# Patient Record
Sex: FEMALE | Race: WHITE | NOT HISPANIC OR LATINO | Employment: UNEMPLOYED | URBAN - METROPOLITAN AREA
[De-identification: names, ages, dates, MRNs, and addresses within clinical notes are randomized per-mention and may not be internally consistent; named-entity substitution may affect disease eponyms.]

---

## 2018-06-27 ENCOUNTER — OFFICE VISIT (OUTPATIENT)
Dept: OBSTETRICS AND GYNECOLOGY | Facility: CLINIC | Age: 33
End: 2018-06-27
Payer: MEDICAID

## 2018-06-27 VITALS
WEIGHT: 189 LBS | BODY MASS INDEX: 30.37 KG/M2 | HEIGHT: 66 IN | DIASTOLIC BLOOD PRESSURE: 70 MMHG | SYSTOLIC BLOOD PRESSURE: 124 MMHG

## 2018-06-27 DIAGNOSIS — K21.9 GASTROESOPHAGEAL REFLUX DISEASE WITHOUT ESOPHAGITIS: ICD-10-CM

## 2018-06-27 DIAGNOSIS — Z3A.31 31 WEEKS GESTATION OF PREGNANCY: ICD-10-CM

## 2018-06-27 DIAGNOSIS — Z32.01 POSITIVE PREGNANCY TEST: Primary | ICD-10-CM

## 2018-06-27 DIAGNOSIS — R11.2 NAUSEA AND VOMITING, INTRACTABILITY OF VOMITING NOT SPECIFIED, UNSPECIFIED VOMITING TYPE: ICD-10-CM

## 2018-06-27 PROCEDURE — 99202 OFFICE O/P NEW SF 15 MIN: CPT | Mod: S$PBB,TH,, | Performed by: OBSTETRICS & GYNECOLOGY

## 2018-06-27 PROCEDURE — 99999 PR PBB SHADOW E&M-NEW PATIENT-LVL III: CPT | Mod: PBBFAC,,, | Performed by: OBSTETRICS & GYNECOLOGY

## 2018-06-27 PROCEDURE — 99203 OFFICE O/P NEW LOW 30 MIN: CPT | Mod: PBBFAC,PO | Performed by: OBSTETRICS & GYNECOLOGY

## 2018-06-27 RX ORDER — ONDANSETRON 4 MG/1
4 TABLET, ORALLY DISINTEGRATING ORAL
Qty: 20 TABLET | Refills: 1 | Status: SHIPPED | OUTPATIENT
Start: 2018-06-27 | End: 2018-07-27

## 2018-06-27 RX ORDER — FERROUS SULFATE 324(65)MG
325 TABLET, DELAYED RELEASE (ENTERIC COATED) ORAL DAILY
COMMUNITY

## 2018-06-27 RX ORDER — PROMETHAZINE HYDROCHLORIDE 25 MG/1
25 TABLET ORAL
Qty: 30 TABLET | Refills: 1 | Status: SHIPPED | OUTPATIENT
Start: 2018-06-27 | End: 2018-07-27

## 2018-06-27 NOTE — LETTER
Victor Hugo Interiano M.D.  Castillo Corrales M.D..  NEHAL Salcedo M.D. Stacey B. Clasen, M.D. Jennifer L. Bigelow, M.D. Mark M. Allen, M.D. Ilsa Leon, M.D Geoffrey Gillen, M.D.                                                                               [address] [place] [address]  Tel [phone]  Fax [phone]        June 27, 2018    Micaela Johnson  [address] [facility] [address]      To Whom It May Concern:    Ms. Johnson is currently under our care for pregnancy: Estimated Date of Delivery: None noted.. Her first visit for this pregnancy was *** .      Sincerely,      Victor Hugo Interiano M.D.  Castillo Corrales M.D.  Iraida Castaneda M.D.  NEHAL Edwards M.D.  NEHAL Pedraza M.D. Ilsa Leon, M.D.  Gene Jiang M.D.  cc

## 2018-06-27 NOTE — PROGRESS NOTES
CC: positive pregnancy test     HPI:   Micaela Johnson 33 y.o.  at 31 5/7 wga by stated EDC which is based on her lmp is here to establish care. [place]. She reports she has had N/V the whole pregnancy and was taking medications [place] but ran out. She also has GERD.  No pregnancy complaints. No Vb, VD, contractions. + FM     OB history: none      No LMP recorded. Patient is pregnant.     History reviewed. No pertinent past medical history.    History reviewed. No pertinent surgical history.    History reviewed. No pertinent family history.    Social History     Social History    Marital status:      Spouse name: N/A    Number of children: N/A    Years of education: N/A     Occupational History    Not on file.     Social History Main Topics    Smoking status: Never Smoker    Smokeless tobacco: Never Used    Alcohol use No    Drug use: No    Sexual activity: Not Currently     Other Topics Concern    Not on file     Social History Narrative    No narrative on file       OB History      Para Term  AB Living    1              SAB TAB Ectopic Multiple Live Births                        COMPREHENSIVE GYN HISTORY:  PAP History: Denies abnormal Paps.  Infection History: Denies STDs. Denies PID.  Benign History: Denies uterine fibroids. Denies ovarian cysts. Denies endometriosis.   Cancer History: Denies cervical cancer. Denies uterine cancer or hyperplasia. Denies ovarian cancer. Denies vulvar cancer or pre-cancer. Denies vaginal cancer or pre-cancer. Denies breast cancer. Denies colon cancer.  Sexual Activity History:   reports that she does not currently engage in sexual activity.   Menstrual History: Age of menarche: 13 years. Every mon, flows for 5 days.       ROS:  GENERAL: Denies weight gain or weight loss. Feeling well overall.   SKIN: Denies rash or lesions.   HEAD: Denies headache.   NODES: Denies enlarged lymph  "nodes.   CHEST: Denies shortness of breath or chest pain   ABDOMEN: No abdominal pain, constipation, diarrhea, nausea, vomiting or rectal bleeding.   URINARY: No frequency, dysuria, hematuria, or burning on urination.  REPRODUCTIVE: See HPI.   BREASTS: The patient denies pain, lumps, or nipple discharge.   HEMATOLOGIC: No easy bruisability.   MUSCULOSKELETAL: Denies joint pain or back pain.   NEUROLOGIC: Denies weakness.   PSYCHIATRIC: Denies depression, anxiety or mood swings.    PE:   /70   Ht 5' 6" (1.676 m)   Wt 85.7 kg (189 lb)   BMI 30.51 kg/m²     APPEARANCE: Well nourished, well developed, in no acute distress.  NECK: Neck symmetric without  thyromegaly.  NODES: No inguinal, cervical lymph node enlargement.  CHEST: Lungs clear to auscultation.  HEART: Regular rate and rhythm, no murmurs, rubs or gallops.  ABDOMEN: Soft. No tenderness or masses. No hernias.  Fundal Height: 33  FHT: 135         1. Positive pregnancy test    2. 31 weeks gestation of pregnancy    3. Nausea and vomiting, intractability of vomiting not specified, unspecified vomiting type    4. Gastroesophageal reflux disease without esophagitis        Plan:    1. IOB labs and dM test and dating US ordered, PNV ordered.   2 will send in phenergen and zofran and discussed taking pepcid.   3. Discussed with patient the way the office works. That we are 1/2 men and women. I will do my best to be there at the delivery but if I can't make it that there would be another physician there who could care for them.   4. Will RTC in 2 weeks with US, hopefully medicaid will be active then and can do STD testing, US and pap.        "

## 2018-06-27 NOTE — LETTER
June 27, 2018    Micaela Johnson  [address] [place] [address]             [facility]  [address] [facility], [address]  [address] [facility]  [place] [address]  Phone: [phone]  To Whom It May Concern:      Ms. Johnson is currently under our care for pregnancy: Estimated Date of Delivery: 08/24/2018.. Her first visit for this pregnancy was 06/27/2018 .       If you have any questions or concerns, please don't hesitate to call.        Sincerely,        Diana Byrne MD

## 2018-07-11 ENCOUNTER — PROCEDURE VISIT (OUTPATIENT)
Dept: OBSTETRICS AND GYNECOLOGY | Facility: CLINIC | Age: 33
End: 2018-07-11
Payer: MEDICAID

## 2018-07-11 ENCOUNTER — ROUTINE PRENATAL (OUTPATIENT)
Dept: OBSTETRICS AND GYNECOLOGY | Facility: CLINIC | Age: 33
End: 2018-07-11
Payer: MEDICAID

## 2018-07-11 VITALS — BODY MASS INDEX: 30.51 KG/M2 | WEIGHT: 189 LBS | SYSTOLIC BLOOD PRESSURE: 122 MMHG | DIASTOLIC BLOOD PRESSURE: 78 MMHG

## 2018-07-11 DIAGNOSIS — Z32.01 POSITIVE PREGNANCY TEST: ICD-10-CM

## 2018-07-11 DIAGNOSIS — R11.2 NAUSEA AND VOMITING, INTRACTABILITY OF VOMITING NOT SPECIFIED, UNSPECIFIED VOMITING TYPE: ICD-10-CM

## 2018-07-11 DIAGNOSIS — O09.30 INSUFFICIENT PRENATAL CARE, UNSPECIFIED TRIMESTER: Primary | ICD-10-CM

## 2018-07-11 DIAGNOSIS — Z3A.33 33 WEEKS GESTATION OF PREGNANCY: Primary | ICD-10-CM

## 2018-07-11 DIAGNOSIS — Z36.3 ANTENATAL SCREENING FOR MALFORMATION USING ULTRASONICS: ICD-10-CM

## 2018-07-11 DIAGNOSIS — K21.9 GASTROESOPHAGEAL REFLUX DISEASE WITHOUT ESOPHAGITIS: ICD-10-CM

## 2018-07-11 PROCEDURE — 99999 PR PBB SHADOW E&M-EST. PATIENT-LVL II: CPT | Mod: PBBFAC,,, | Performed by: OBSTETRICS & GYNECOLOGY

## 2018-07-11 PROCEDURE — 76819 FETAL BIOPHYS PROFIL W/O NST: CPT | Mod: PBBFAC,PO

## 2018-07-11 PROCEDURE — 99212 OFFICE O/P EST SF 10 MIN: CPT | Mod: PBBFAC,PO,25 | Performed by: OBSTETRICS & GYNECOLOGY

## 2018-07-11 PROCEDURE — 76819 FETAL BIOPHYS PROFIL W/O NST: CPT | Mod: 26,S$PBB,, | Performed by: OBSTETRICS & GYNECOLOGY

## 2018-07-11 PROCEDURE — 76805 OB US >/= 14 WKS SNGL FETUS: CPT | Mod: 26,S$PBB,, | Performed by: OBSTETRICS & GYNECOLOGY

## 2018-07-11 PROCEDURE — 99212 OFFICE O/P EST SF 10 MIN: CPT | Mod: TH,S$PBB,25, | Performed by: OBSTETRICS & GYNECOLOGY

## 2018-07-11 PROCEDURE — 76805 OB US >/= 14 WKS SNGL FETUS: CPT | Mod: PBBFAC,PO

## 2018-07-11 NOTE — PROGRESS NOTES
@ 33 4/7 wga   1. Still having N/V, didn't go  medications. Weight stable from last week. No ketones in urine, encouraged to  medications  2. Patient doesn't want to lab work or exam because she is worried she will get a large bill because her medicaid isn't active, will do next week but discussed with her that normally they retro pay it.   3. US today c/w period and growth appropriate at 25%, BPP 8/8   4. Will do gbs next visit   5. Dizziness when stands, orthostatics negative, encouraged PO hydration and to do labs to make sure not too anemic.

## 2018-07-18 ENCOUNTER — ROUTINE PRENATAL (OUTPATIENT)
Dept: OBSTETRICS AND GYNECOLOGY | Facility: CLINIC | Age: 33
End: 2018-07-18
Payer: MEDICAID

## 2018-07-18 VITALS
BODY MASS INDEX: 34.27 KG/M2 | WEIGHT: 212.31 LBS | DIASTOLIC BLOOD PRESSURE: 82 MMHG | SYSTOLIC BLOOD PRESSURE: 125 MMHG

## 2018-07-18 DIAGNOSIS — Z3A.34 34 WEEKS GESTATION OF PREGNANCY: Primary | ICD-10-CM

## 2018-07-18 PROCEDURE — 99212 OFFICE O/P EST SF 10 MIN: CPT | Mod: PBBFAC,PO | Performed by: OBSTETRICS & GYNECOLOGY

## 2018-07-18 PROCEDURE — 87624 HPV HI-RISK TYP POOLED RSLT: CPT

## 2018-07-18 PROCEDURE — 88175 CYTOPATH C/V AUTO FLUID REDO: CPT

## 2018-07-18 PROCEDURE — 99212 OFFICE O/P EST SF 10 MIN: CPT | Mod: TH,S$PBB,, | Performed by: OBSTETRICS & GYNECOLOGY

## 2018-07-18 PROCEDURE — 87491 CHLMYD TRACH DNA AMP PROBE: CPT

## 2018-07-18 PROCEDURE — 99999 PR PBB SHADOW E&M-EST. PATIENT-LVL II: CPT | Mod: PBBFAC,,, | Performed by: OBSTETRICS & GYNECOLOGY

## 2018-07-18 NOTE — PROGRESS NOTES
@ 34 4/7 wga   1. N/V resolved   2. Encourage to do lab work today and will do GBS next visit  3. Consents done today with language line

## 2018-07-21 LAB
C TRACH DNA SPEC QL NAA+PROBE: NOT DETECTED
N GONORRHOEA DNA SPEC QL NAA+PROBE: NOT DETECTED

## 2018-07-24 LAB
HPV HR 12 DNA CVX QL NAA+PROBE: NEGATIVE
HPV16 AG SPEC QL: NEGATIVE
HPV18 DNA SPEC QL NAA+PROBE: NEGATIVE

## 2018-07-25 ENCOUNTER — ROUTINE PRENATAL (OUTPATIENT)
Dept: OBSTETRICS AND GYNECOLOGY | Facility: CLINIC | Age: 33
End: 2018-07-25
Payer: MEDICAID

## 2018-07-25 VITALS
BODY MASS INDEX: 31.24 KG/M2 | DIASTOLIC BLOOD PRESSURE: 78 MMHG | SYSTOLIC BLOOD PRESSURE: 118 MMHG | WEIGHT: 193.56 LBS

## 2018-07-25 DIAGNOSIS — O12.10 PROTEINURIA DURING PREGNANCY: ICD-10-CM

## 2018-07-25 DIAGNOSIS — Z3A.35 35 WEEKS GESTATION OF PREGNANCY: ICD-10-CM

## 2018-07-25 DIAGNOSIS — O09.30 INSUFFICIENT PRENATAL CARE, UNSPECIFIED TRIMESTER: Primary | ICD-10-CM

## 2018-07-25 PROCEDURE — 84156 ASSAY OF PROTEIN URINE: CPT

## 2018-07-25 PROCEDURE — 99999 PR PBB SHADOW E&M-EST. PATIENT-LVL II: CPT | Mod: PBBFAC,,, | Performed by: OBSTETRICS & GYNECOLOGY

## 2018-07-25 PROCEDURE — 99213 OFFICE O/P EST LOW 20 MIN: CPT | Mod: TH,S$PBB,, | Performed by: OBSTETRICS & GYNECOLOGY

## 2018-07-25 PROCEDURE — 99212 OFFICE O/P EST SF 10 MIN: CPT | Mod: PBBFAC,TH,PO | Performed by: OBSTETRICS & GYNECOLOGY

## 2018-07-25 NOTE — PROGRESS NOTES
For the headache increase water intake genet with the excessive heat. Zantac daily for heartburn. P/C ration sent but likely 1+ 2/2 blood in urine

## 2018-07-25 NOTE — PROGRESS NOTES
Patient has trace of leukocytes and +3 blood in urine. Patient is complaining of heartburn and headache

## 2018-07-26 LAB
CREAT UR-MCNC: 379 MG/DL
PROT UR-MCNC: 49 MG/DL
PROT/CREAT RATIO, UR: 0.13

## 2018-08-02 ENCOUNTER — ROUTINE PRENATAL (OUTPATIENT)
Dept: OBSTETRICS AND GYNECOLOGY | Facility: CLINIC | Age: 33
End: 2018-08-02
Payer: MEDICAID

## 2018-08-02 ENCOUNTER — LAB VISIT (OUTPATIENT)
Dept: LAB | Facility: HOSPITAL | Age: 33
End: 2018-08-02
Attending: OBSTETRICS & GYNECOLOGY
Payer: MEDICAID

## 2018-08-02 VITALS — SYSTOLIC BLOOD PRESSURE: 108 MMHG | WEIGHT: 196.44 LBS | DIASTOLIC BLOOD PRESSURE: 70 MMHG | BODY MASS INDEX: 31.7 KG/M2

## 2018-08-02 DIAGNOSIS — Z3A.36 36 WEEKS GESTATION OF PREGNANCY: ICD-10-CM

## 2018-08-02 DIAGNOSIS — O09.30 LATE PRENATAL CARE: ICD-10-CM

## 2018-08-02 DIAGNOSIS — Z3A.36 36 WEEKS GESTATION OF PREGNANCY: Primary | ICD-10-CM

## 2018-08-02 LAB
ALBUMIN SERPL BCP-MCNC: 3 G/DL
ALP SERPL-CCNC: 100 U/L
ALT SERPL W/O P-5'-P-CCNC: 15 U/L
ANION GAP SERPL CALC-SCNC: 7 MMOL/L
AST SERPL-CCNC: 15 U/L
BILIRUB SERPL-MCNC: 0.4 MG/DL
BUN SERPL-MCNC: 7 MG/DL
CALCIUM SERPL-MCNC: 8.6 MG/DL
CHLORIDE SERPL-SCNC: 106 MMOL/L
CO2 SERPL-SCNC: 23 MMOL/L
CREAT SERPL-MCNC: 0.6 MG/DL
EST. GFR  (AFRICAN AMERICAN): >60 ML/MIN/1.73 M^2
EST. GFR  (NON AFRICAN AMERICAN): >60 ML/MIN/1.73 M^2
GLUCOSE SERPL-MCNC: 93 MG/DL
POTASSIUM SERPL-SCNC: 3.7 MMOL/L
PROT SERPL-MCNC: 6.8 G/DL
SODIUM SERPL-SCNC: 136 MMOL/L

## 2018-08-02 PROCEDURE — 36415 COLL VENOUS BLD VENIPUNCTURE: CPT

## 2018-08-02 PROCEDURE — 99213 OFFICE O/P EST LOW 20 MIN: CPT | Mod: PBBFAC,TH,PO | Performed by: OBSTETRICS & GYNECOLOGY

## 2018-08-02 PROCEDURE — 99999 PR PBB SHADOW E&M-EST. PATIENT-LVL III: CPT | Mod: PBBFAC,,, | Performed by: OBSTETRICS & GYNECOLOGY

## 2018-08-02 PROCEDURE — 87081 CULTURE SCREEN ONLY: CPT

## 2018-08-02 PROCEDURE — 99212 OFFICE O/P EST SF 10 MIN: CPT | Mod: TH,S$PBB,, | Performed by: OBSTETRICS & GYNECOLOGY

## 2018-08-02 PROCEDURE — 80053 COMPREHEN METABOLIC PANEL: CPT

## 2018-08-02 RX ORDER — FAMOTIDINE 20 MG/1
20 TABLET, FILM COATED ORAL 2 TIMES DAILY
Qty: 60 TABLET | Refills: 1 | Status: SHIPPED | OUTPATIENT
Start: 2018-08-02 | End: 2019-08-02

## 2018-08-02 RX ORDER — PROMETHAZINE HYDROCHLORIDE 25 MG/1
25 TABLET ORAL
Qty: 30 TABLET | Refills: 1 | Status: SHIPPED | OUTPATIENT
Start: 2018-08-02 | End: 2018-09-01

## 2018-08-02 NOTE — PROGRESS NOTES
@ 36 5/7 wga   1. N/V improved but refill sent in  2. Encourage to do lab work today for the 3rd time! and will do GBS today  3. pepcid sent for gerd   4. Repeat US next week for growth as she was late to care

## 2018-08-06 LAB — BACTERIA SPEC AEROBE CULT: NORMAL

## 2018-08-09 ENCOUNTER — LAB VISIT (OUTPATIENT)
Dept: LAB | Facility: HOSPITAL | Age: 33
End: 2018-08-09
Attending: OBSTETRICS & GYNECOLOGY
Payer: MEDICAID

## 2018-08-09 ENCOUNTER — ROUTINE PRENATAL (OUTPATIENT)
Dept: OBSTETRICS AND GYNECOLOGY | Facility: CLINIC | Age: 33
End: 2018-08-09
Payer: MEDICAID

## 2018-08-09 ENCOUNTER — PROCEDURE VISIT (OUTPATIENT)
Dept: OBSTETRICS AND GYNECOLOGY | Facility: CLINIC | Age: 33
End: 2018-08-09
Payer: MEDICAID

## 2018-08-09 VITALS — WEIGHT: 194 LBS | DIASTOLIC BLOOD PRESSURE: 80 MMHG | SYSTOLIC BLOOD PRESSURE: 130 MMHG | BODY MASS INDEX: 31.31 KG/M2

## 2018-08-09 DIAGNOSIS — Z32.01 POSITIVE PREGNANCY TEST: ICD-10-CM

## 2018-08-09 DIAGNOSIS — K21.9 GASTROESOPHAGEAL REFLUX DISEASE WITHOUT ESOPHAGITIS: ICD-10-CM

## 2018-08-09 DIAGNOSIS — O09.30 LATE PRENATAL CARE: ICD-10-CM

## 2018-08-09 DIAGNOSIS — R11.2 NAUSEA AND VOMITING, INTRACTABILITY OF VOMITING NOT SPECIFIED, UNSPECIFIED VOMITING TYPE: ICD-10-CM

## 2018-08-09 DIAGNOSIS — Z36.89 ENCOUNTER FOR ULTRASOUND TO CHECK FETAL GROWTH: Primary | ICD-10-CM

## 2018-08-09 DIAGNOSIS — Z3A.37 37 WEEKS GESTATION OF PREGNANCY: Primary | ICD-10-CM

## 2018-08-09 LAB
ABO + RH BLD: NORMAL
BASOPHILS # BLD AUTO: 0.02 K/UL
BASOPHILS NFR BLD: 0.3 %
BLD GP AB SCN CELLS X3 SERPL QL: NORMAL
DIFFERENTIAL METHOD: ABNORMAL
EOSINOPHIL # BLD AUTO: 0 K/UL
EOSINOPHIL NFR BLD: 0.6 %
ERYTHROCYTE [DISTWIDTH] IN BLOOD BY AUTOMATED COUNT: 12 %
GLUCOSE SERPL-MCNC: 104 MG/DL
HCT VFR BLD AUTO: 30.6 %
HGB BLD-MCNC: 10.3 G/DL
LYMPHOCYTES # BLD AUTO: 1.2 K/UL
LYMPHOCYTES NFR BLD: 17.1 %
MCH RBC QN AUTO: 30.6 PG
MCHC RBC AUTO-ENTMCNC: 33.7 G/DL
MCV RBC AUTO: 91 FL
MONOCYTES # BLD AUTO: 0.8 K/UL
MONOCYTES NFR BLD: 10.6 %
NEUTROPHILS # BLD AUTO: 5.1 K/UL
NEUTROPHILS NFR BLD: 70.8 %
PLATELET # BLD AUTO: 232 K/UL
PMV BLD AUTO: 9.2 FL
RBC # BLD AUTO: 3.37 M/UL
WBC # BLD AUTO: 7.14 K/UL

## 2018-08-09 PROCEDURE — 87340 HEPATITIS B SURFACE AG IA: CPT

## 2018-08-09 PROCEDURE — 86592 SYPHILIS TEST NON-TREP QUAL: CPT

## 2018-08-09 PROCEDURE — 99212 OFFICE O/P EST SF 10 MIN: CPT | Mod: TH,S$PBB,25, | Performed by: OBSTETRICS & GYNECOLOGY

## 2018-08-09 PROCEDURE — 85025 COMPLETE CBC W/AUTO DIFF WBC: CPT

## 2018-08-09 PROCEDURE — 86703 HIV-1/HIV-2 1 RESULT ANTBDY: CPT

## 2018-08-09 PROCEDURE — 36415 COLL VENOUS BLD VENIPUNCTURE: CPT

## 2018-08-09 PROCEDURE — 76816 OB US FOLLOW-UP PER FETUS: CPT | Mod: 26,S$PBB,, | Performed by: OBSTETRICS & GYNECOLOGY

## 2018-08-09 PROCEDURE — 76816 OB US FOLLOW-UP PER FETUS: CPT | Mod: PBBFAC,PO

## 2018-08-09 PROCEDURE — 82950 GLUCOSE TEST: CPT

## 2018-08-09 PROCEDURE — 99999 PR PBB SHADOW E&M-EST. PATIENT-LVL II: CPT | Mod: PBBFAC,,, | Performed by: OBSTETRICS & GYNECOLOGY

## 2018-08-09 PROCEDURE — 86901 BLOOD TYPING SEROLOGIC RH(D): CPT

## 2018-08-09 PROCEDURE — 99212 OFFICE O/P EST SF 10 MIN: CPT | Mod: PBBFAC,TH,PO | Performed by: OBSTETRICS & GYNECOLOGY

## 2018-08-09 PROCEDURE — 86762 RUBELLA ANTIBODY: CPT

## 2018-08-09 NOTE — PROGRESS NOTES
@ 37 5/7 wga   1. N/V improved but refill sent in  2. Encourage to do lab work today for the 3rd time! GBS negative  3. pepcid sent for gerd   4. Repeat US 8/9/: 6lb 7 oz, EFW: 24 %, vertex BPP 8/8

## 2018-08-10 LAB
HBV SURFACE AG SERPL QL IA: NEGATIVE
HIV 1+2 AB+HIV1 P24 AG SERPL QL IA: NEGATIVE
RPR SER QL: NORMAL
RUBV IGG SER-ACNC: 220 IU/ML
RUBV IGG SER-IMP: REACTIVE

## 2018-08-17 ENCOUNTER — ROUTINE PRENATAL (OUTPATIENT)
Dept: OBSTETRICS AND GYNECOLOGY | Facility: CLINIC | Age: 33
End: 2018-08-17
Payer: MEDICAID

## 2018-08-17 ENCOUNTER — PROCEDURE VISIT (OUTPATIENT)
Dept: OBSTETRICS AND GYNECOLOGY | Facility: CLINIC | Age: 33
End: 2018-08-17
Payer: MEDICAID

## 2018-08-17 VITALS — WEIGHT: 196 LBS | SYSTOLIC BLOOD PRESSURE: 132 MMHG | BODY MASS INDEX: 31.64 KG/M2 | DIASTOLIC BLOOD PRESSURE: 90 MMHG

## 2018-08-17 DIAGNOSIS — Z3A.38 38 WEEKS GESTATION OF PREGNANCY: Primary | ICD-10-CM

## 2018-08-17 DIAGNOSIS — O36.8130 DECREASED FETAL MOVEMENTS IN THIRD TRIMESTER, SINGLE OR UNSPECIFIED FETUS: ICD-10-CM

## 2018-08-17 DIAGNOSIS — O16.3 ELEVATED BLOOD PRESSURE AFFECTING PREGNANCY IN THIRD TRIMESTER, ANTEPARTUM: ICD-10-CM

## 2018-08-17 PROCEDURE — 82570 ASSAY OF URINE CREATININE: CPT

## 2018-08-17 PROCEDURE — 99212 OFFICE O/P EST SF 10 MIN: CPT | Mod: PBBFAC,TH,PO | Performed by: OBSTETRICS & GYNECOLOGY

## 2018-08-17 PROCEDURE — 99999 PR PBB SHADOW E&M-EST. PATIENT-LVL II: CPT | Mod: PBBFAC,,, | Performed by: OBSTETRICS & GYNECOLOGY

## 2018-08-17 PROCEDURE — 99212 OFFICE O/P EST SF 10 MIN: CPT | Mod: 25,TH,S$PBB, | Performed by: OBSTETRICS & GYNECOLOGY

## 2018-08-17 PROCEDURE — 76819 FETAL BIOPHYS PROFIL W/O NST: CPT | Mod: 26,S$PBB,, | Performed by: OBSTETRICS & GYNECOLOGY

## 2018-08-17 PROCEDURE — 76819 FETAL BIOPHYS PROFIL W/O NST: CPT | Mod: PBBFAC,PO

## 2018-08-17 RX ORDER — BUTORPHANOL TARTRATE 1 MG/ML
1 INJECTION INTRAMUSCULAR; INTRAVENOUS
Status: CANCELLED | OUTPATIENT
Start: 2018-08-17

## 2018-08-17 RX ORDER — MISOPROSTOL 100 UG/1
600 TABLET ORAL
Status: CANCELLED | OUTPATIENT
Start: 2018-08-17

## 2018-08-17 RX ORDER — ONDANSETRON 4 MG/1
8 TABLET, ORALLY DISINTEGRATING ORAL EVERY 8 HOURS PRN
Status: CANCELLED | OUTPATIENT
Start: 2018-08-17

## 2018-08-17 RX ORDER — CARBOPROST TROMETHAMINE 250 UG/ML
250 INJECTION, SOLUTION INTRAMUSCULAR
Status: CANCELLED | OUTPATIENT
Start: 2018-08-17

## 2018-08-17 RX ORDER — METHYLERGONOVINE MALEATE 0.2 MG/ML
200 INJECTION INTRAVENOUS
Status: CANCELLED | OUTPATIENT
Start: 2018-08-17

## 2018-08-17 RX ORDER — SODIUM CHLORIDE, SODIUM LACTATE, POTASSIUM CHLORIDE, CALCIUM CHLORIDE 600; 310; 30; 20 MG/100ML; MG/100ML; MG/100ML; MG/100ML
INJECTION, SOLUTION INTRAVENOUS CONTINUOUS
Status: CANCELLED | OUTPATIENT
Start: 2018-08-17

## 2018-08-17 RX ORDER — TERBUTALINE SULFATE 1 MG/ML
0.25 INJECTION SUBCUTANEOUS
Status: CANCELLED | OUTPATIENT
Start: 2018-08-17

## 2018-08-17 RX ORDER — BUTORPHANOL TARTRATE 1 MG/ML
2 INJECTION INTRAMUSCULAR; INTRAVENOUS
Status: CANCELLED | OUTPATIENT
Start: 2018-08-17

## 2018-08-17 RX ORDER — MISOPROSTOL 100 MCG
25 TABLET ORAL EVERY 4 HOURS
Status: CANCELLED | OUTPATIENT
Start: 2018-08-17 | End: 2018-08-18

## 2018-08-17 RX ORDER — SODIUM CHLORIDE 9 MG/ML
INJECTION, SOLUTION INTRAVENOUS
Status: CANCELLED | OUTPATIENT
Start: 2018-08-17

## 2018-08-17 NOTE — PROGRESS NOTES
@ 38 6/7 wga   1. N/V improved but refill sent in  2. Anemic: iron BID; GBS negative  3. pepcid sent for gerd   4. Repeat US 8/9/18: 6lb 7 oz, EFW: 24 %, vertex BPP 8/8   BPP today is 8/8   5. Had elevated blood pressure today, no complaints. DTR 1+, Will send for protein:cr ratio and set up for IOL 8/22 at 9pm. Will see back on Monday for repeat BP check. And discussed pre-e precautions and when to come back to hospital.

## 2018-08-20 ENCOUNTER — ROUTINE PRENATAL (OUTPATIENT)
Dept: OBSTETRICS AND GYNECOLOGY | Facility: CLINIC | Age: 33
End: 2018-08-20
Payer: MEDICAID

## 2018-08-20 VITALS — SYSTOLIC BLOOD PRESSURE: 130 MMHG | BODY MASS INDEX: 31.31 KG/M2 | WEIGHT: 194 LBS | DIASTOLIC BLOOD PRESSURE: 86 MMHG

## 2018-08-20 DIAGNOSIS — Z3A.39 39 WEEKS GESTATION OF PREGNANCY: Primary | ICD-10-CM

## 2018-08-20 LAB
CREAT UR-MCNC: 211 MG/DL
PROT UR-MCNC: 18 MG/DL
PROT/CREAT UR: 0.09 MG/G{CREAT}

## 2018-08-20 PROCEDURE — 99212 OFFICE O/P EST SF 10 MIN: CPT | Mod: PBBFAC,PO | Performed by: OBSTETRICS & GYNECOLOGY

## 2018-08-20 PROCEDURE — 99212 OFFICE O/P EST SF 10 MIN: CPT | Mod: TH,S$PBB,, | Performed by: OBSTETRICS & GYNECOLOGY

## 2018-08-20 PROCEDURE — 99999 PR PBB SHADOW E&M-EST. PATIENT-LVL II: CPT | Mod: PBBFAC,,, | Performed by: OBSTETRICS & GYNECOLOGY

## 2018-08-22 ENCOUNTER — HOSPITAL ENCOUNTER (INPATIENT)
Facility: HOSPITAL | Age: 33
LOS: 2 days | Discharge: HOME | End: 2018-08-24
Attending: OBSTETRICS & GYNECOLOGY | Admitting: OBSTETRICS & GYNECOLOGY
Payer: MEDICAID

## 2018-08-22 DIAGNOSIS — Z3A.38 38 WEEKS GESTATION OF PREGNANCY: ICD-10-CM

## 2018-08-22 LAB
BASOPHILS # BLD AUTO: 0.02 K/UL
BASOPHILS NFR BLD: 0.3 %
DIFFERENTIAL METHOD: ABNORMAL
EOSINOPHIL # BLD AUTO: 0.1 K/UL
EOSINOPHIL NFR BLD: 0.8 %
ERYTHROCYTE [DISTWIDTH] IN BLOOD BY AUTOMATED COUNT: 12 %
HCT VFR BLD AUTO: 30.8 %
HGB BLD-MCNC: 10.5 G/DL
LYMPHOCYTES # BLD AUTO: 1.9 K/UL
LYMPHOCYTES NFR BLD: 28.2 %
MCH RBC QN AUTO: 30.6 PG
MCHC RBC AUTO-ENTMCNC: 34.1 G/DL
MCV RBC AUTO: 90 FL
MONOCYTES # BLD AUTO: 0.6 K/UL
MONOCYTES NFR BLD: 9.4 %
NEUTROPHILS # BLD AUTO: 4.1 K/UL
NEUTROPHILS NFR BLD: 61 %
PLATELET # BLD AUTO: 216 K/UL
PMV BLD AUTO: 9.2 FL
RBC # BLD AUTO: 3.43 M/UL
WBC # BLD AUTO: 6.63 K/UL

## 2018-08-22 PROCEDURE — 11000001 HC ACUTE MED/SURG PRIVATE ROOM

## 2018-08-22 PROCEDURE — 85025 COMPLETE CBC W/AUTO DIFF WBC: CPT

## 2018-08-22 PROCEDURE — 86901 BLOOD TYPING SEROLOGIC RH(D): CPT

## 2018-08-22 PROCEDURE — 25000003 PHARM REV CODE 250: Performed by: OBSTETRICS & GYNECOLOGY

## 2018-08-22 RX ORDER — MISOPROSTOL 100 MCG
25 TABLET ORAL EVERY 4 HOURS
Status: DISCONTINUED | OUTPATIENT
Start: 2018-08-22 | End: 2018-08-23

## 2018-08-22 RX ORDER — OXYTOCIN/RINGER'S LACTATE 20/1000 ML
2 PLASTIC BAG, INJECTION (ML) INTRAVENOUS CONTINUOUS
Status: DISCONTINUED | OUTPATIENT
Start: 2018-08-22 | End: 2018-08-23

## 2018-08-22 RX ORDER — BUTORPHANOL TARTRATE 2 MG/ML
1 INJECTION INTRAMUSCULAR; INTRAVENOUS
Status: DISCONTINUED | OUTPATIENT
Start: 2018-08-22 | End: 2018-08-23

## 2018-08-22 RX ORDER — ONDANSETRON 8 MG/1
8 TABLET, ORALLY DISINTEGRATING ORAL EVERY 8 HOURS PRN
Status: DISCONTINUED | OUTPATIENT
Start: 2018-08-22 | End: 2018-08-23

## 2018-08-22 RX ORDER — BUTORPHANOL TARTRATE 2 MG/ML
2 INJECTION INTRAMUSCULAR; INTRAVENOUS
Status: DISCONTINUED | OUTPATIENT
Start: 2018-08-22 | End: 2018-08-23

## 2018-08-22 RX ORDER — SODIUM CHLORIDE 9 MG/ML
INJECTION, SOLUTION INTRAVENOUS
Status: DISCONTINUED | OUTPATIENT
Start: 2018-08-22 | End: 2018-08-23

## 2018-08-22 RX ORDER — METHYLERGONOVINE MALEATE 0.2 MG/ML
200 INJECTION INTRAVENOUS
Status: DISCONTINUED | OUTPATIENT
Start: 2018-08-22 | End: 2018-08-23

## 2018-08-22 RX ORDER — MISOPROSTOL 200 UG/1
600 TABLET ORAL
Status: DISCONTINUED | OUTPATIENT
Start: 2018-08-22 | End: 2018-08-23

## 2018-08-22 RX ORDER — TERBUTALINE SULFATE 1 MG/ML
0.25 INJECTION SUBCUTANEOUS
Status: DISCONTINUED | OUTPATIENT
Start: 2018-08-22 | End: 2018-08-23

## 2018-08-22 RX ORDER — SODIUM CHLORIDE, SODIUM LACTATE, POTASSIUM CHLORIDE, CALCIUM CHLORIDE 600; 310; 30; 20 MG/100ML; MG/100ML; MG/100ML; MG/100ML
INJECTION, SOLUTION INTRAVENOUS CONTINUOUS
Status: DISCONTINUED | OUTPATIENT
Start: 2018-08-22 | End: 2018-08-23

## 2018-08-22 RX ORDER — CARBOPROST TROMETHAMINE 250 UG/ML
250 INJECTION, SOLUTION INTRAMUSCULAR
Status: DISCONTINUED | OUTPATIENT
Start: 2018-08-22 | End: 2018-08-23

## 2018-08-22 RX ORDER — OXYTOCIN/RINGER'S LACTATE 20/1000 ML
333 PLASTIC BAG, INJECTION (ML) INTRAVENOUS CONTINUOUS
Status: ACTIVE | OUTPATIENT
Start: 2018-08-22 | End: 2018-08-22

## 2018-08-22 RX ADMIN — SODIUM CHLORIDE, SODIUM LACTATE, POTASSIUM CHLORIDE, AND CALCIUM CHLORIDE: .6; .31; .03; .02 INJECTION, SOLUTION INTRAVENOUS at 10:08

## 2018-08-23 ENCOUNTER — ANESTHESIA (OUTPATIENT)
Dept: OBSTETRICS AND GYNECOLOGY | Facility: HOSPITAL | Age: 33
End: 2018-08-23
Payer: MEDICAID

## 2018-08-23 ENCOUNTER — ANESTHESIA EVENT (OUTPATIENT)
Dept: OBSTETRICS AND GYNECOLOGY | Facility: HOSPITAL | Age: 33
End: 2018-08-23
Payer: MEDICAID

## 2018-08-23 PROBLEM — Z3A.38 38 WEEKS GESTATION OF PREGNANCY: Status: RESOLVED | Noted: 2018-08-22 | Resolved: 2018-08-23

## 2018-08-23 PROBLEM — Z3A.38 38 WEEKS GESTATION OF PREGNANCY: Status: ACTIVE | Noted: 2018-08-23

## 2018-08-23 LAB
ABO + RH BLD: NORMAL
BLD GP AB SCN CELLS X3 SERPL QL: NORMAL

## 2018-08-23 PROCEDURE — 63600175 PHARM REV CODE 636 W HCPCS: Performed by: OBSTETRICS & GYNECOLOGY

## 2018-08-23 PROCEDURE — 62326 NJX INTERLAMINAR LMBR/SAC: CPT | Performed by: ANESTHESIOLOGY

## 2018-08-23 PROCEDURE — 72200006 HC VAGINAL DELIVERY LEVEL III

## 2018-08-23 PROCEDURE — 0HQ9XZZ REPAIR PERINEUM SKIN, EXTERNAL APPROACH: ICD-10-PCS | Performed by: OBSTETRICS & GYNECOLOGY

## 2018-08-23 PROCEDURE — 25000003 PHARM REV CODE 250: Performed by: OBSTETRICS & GYNECOLOGY

## 2018-08-23 PROCEDURE — 11000001 HC ACUTE MED/SURG PRIVATE ROOM

## 2018-08-23 PROCEDURE — 72100002 HC LABOR CARE, 1ST 8 HOURS

## 2018-08-23 PROCEDURE — 27800516 HC TRAY, EPIDURAL COMBO: Performed by: ANESTHESIOLOGY

## 2018-08-23 PROCEDURE — 59409 OBSTETRICAL CARE: CPT | Mod: GB,,, | Performed by: OBSTETRICS & GYNECOLOGY

## 2018-08-23 PROCEDURE — 72100003 HC LABOR CARE, EA. ADDL. 8 HRS

## 2018-08-23 PROCEDURE — 51702 INSERT TEMP BLADDER CATH: CPT

## 2018-08-23 PROCEDURE — 25000003 PHARM REV CODE 250: Performed by: ANESTHESIOLOGY

## 2018-08-23 PROCEDURE — 27200710 HC EPIDURAL INFUSION PUMP SET: Performed by: ANESTHESIOLOGY

## 2018-08-23 RX ORDER — OXYCODONE AND ACETAMINOPHEN 10; 325 MG/1; MG/1
1 TABLET ORAL EVERY 4 HOURS PRN
Status: DISCONTINUED | OUTPATIENT
Start: 2018-08-23 | End: 2018-08-24 | Stop reason: HOSPADM

## 2018-08-23 RX ORDER — NALOXONE HCL 0.4 MG/ML
0.02 VIAL (ML) INJECTION
Status: DISCONTINUED | OUTPATIENT
Start: 2018-08-23 | End: 2018-08-23

## 2018-08-23 RX ORDER — HYDROCORTISONE 25 MG/G
CREAM TOPICAL 3 TIMES DAILY PRN
Status: DISCONTINUED | OUTPATIENT
Start: 2018-08-23 | End: 2018-08-24 | Stop reason: HOSPADM

## 2018-08-23 RX ORDER — FENTANYL CITRATE 50 UG/ML
INJECTION, SOLUTION INTRAMUSCULAR; INTRAVENOUS
Status: DISPENSED
Start: 2018-08-23 | End: 2018-08-23

## 2018-08-23 RX ORDER — ACETAMINOPHEN 325 MG/1
650 TABLET ORAL EVERY 6 HOURS PRN
Status: DISCONTINUED | OUTPATIENT
Start: 2018-08-23 | End: 2018-08-24 | Stop reason: HOSPADM

## 2018-08-23 RX ORDER — ROPIVACAINE HYDROCHLORIDE 2 MG/ML
INJECTION, SOLUTION EPIDURAL; INFILTRATION; PERINEURAL
Status: DISPENSED
Start: 2018-08-23 | End: 2018-08-23

## 2018-08-23 RX ORDER — OXYTOCIN/RINGER'S LACTATE 20/1000 ML
41.65 PLASTIC BAG, INJECTION (ML) INTRAVENOUS CONTINUOUS
Status: ACTIVE | OUTPATIENT
Start: 2018-08-23 | End: 2018-08-23

## 2018-08-23 RX ORDER — IBUPROFEN 600 MG/1
600 TABLET ORAL EVERY 6 HOURS PRN
Status: DISCONTINUED | OUTPATIENT
Start: 2018-08-23 | End: 2018-08-24 | Stop reason: HOSPADM

## 2018-08-23 RX ORDER — DOCUSATE SODIUM 100 MG/1
200 CAPSULE, LIQUID FILLED ORAL 2 TIMES DAILY
Status: DISCONTINUED | OUTPATIENT
Start: 2018-08-23 | End: 2018-08-24 | Stop reason: HOSPADM

## 2018-08-23 RX ORDER — DIPHENHYDRAMINE HCL 25 MG
25 CAPSULE ORAL EVERY 4 HOURS PRN
Status: DISCONTINUED | OUTPATIENT
Start: 2018-08-23 | End: 2018-08-24 | Stop reason: HOSPADM

## 2018-08-23 RX ORDER — OXYCODONE AND ACETAMINOPHEN 5; 325 MG/1; MG/1
1 TABLET ORAL EVERY 4 HOURS PRN
Status: DISCONTINUED | OUTPATIENT
Start: 2018-08-23 | End: 2018-08-24 | Stop reason: HOSPADM

## 2018-08-23 RX ORDER — ONDANSETRON 8 MG/1
8 TABLET, ORALLY DISINTEGRATING ORAL EVERY 8 HOURS PRN
Status: DISCONTINUED | OUTPATIENT
Start: 2018-08-23 | End: 2018-08-24 | Stop reason: HOSPADM

## 2018-08-23 RX ORDER — OXYCODONE HYDROCHLORIDE 5 MG/1
5 TABLET ORAL
Status: CANCELLED | OUTPATIENT
Start: 2018-08-23

## 2018-08-23 RX ORDER — DIPHENHYDRAMINE HYDROCHLORIDE 50 MG/ML
25 INJECTION INTRAMUSCULAR; INTRAVENOUS EVERY 4 HOURS PRN
Status: DISCONTINUED | OUTPATIENT
Start: 2018-08-23 | End: 2018-08-24 | Stop reason: HOSPADM

## 2018-08-23 RX ADMIN — IBUPROFEN 600 MG: 600 TABLET ORAL at 10:08

## 2018-08-23 RX ADMIN — DOCUSATE SODIUM 200 MG: 100 CAPSULE, LIQUID FILLED ORAL at 09:08

## 2018-08-23 RX ADMIN — IBUPROFEN 600 MG: 600 TABLET ORAL at 11:08

## 2018-08-23 RX ADMIN — OXYCODONE HYDROCHLORIDE AND ACETAMINOPHEN 1 TABLET: 5; 325 TABLET ORAL at 11:08

## 2018-08-23 RX ADMIN — Medication 41.65 MILLI-UNITS/MIN: at 08:08

## 2018-08-23 RX ADMIN — ACETAMINOPHEN 650 MG: 325 TABLET ORAL at 10:08

## 2018-08-23 RX ADMIN — Medication 12 ML/HR: at 02:08

## 2018-08-23 NOTE — NURSING
0840 patient refused skin to skin states too tired asked and explained 4x still refused infant taken to warmer.     0900 patient holding infant with family member in room wrapped in blankets but refused skin to skin. RN remains at bedside. Epidural drip turned off    0930 water and apple juice provided to patient. Holding infant bonding RN and patient family member at bedside    1000 baby wrapped up family member holding baby: awake alert color pink resp wnl.

## 2018-08-23 NOTE — LACTATION NOTE
Mother stated that she does not want to breastfeed while she is in hospital.  She will breastfeed after discharge when she is home.  Discussed with her supply/demand, feeding freq/patterns, benefits of colostrum/BM for baby.  Encouraged mom to call for assistance if she decides to breastfeed baby while here.  All questions answered, verbalizes understanding, positive reinforcement given to patient.  Patient requested family member to translate for her.

## 2018-08-23 NOTE — PLAN OF CARE
Problem: Patient Care Overview  Goal: Plan of Care Review  Outcome: Ongoing (interventions implemented as appropriate)  Mother will continue to formula feed baby 8+/24 hrs. and on cue.  She will do skin to skin between feedings.  She will follow up with pediatrician as instructed.

## 2018-08-23 NOTE — ANESTHESIA PREPROCEDURE EVALUATION
08/23/2018  Micaela Johnson is a 33 y.o., female.    Anesthesia Evaluation    I have reviewed the Patient Summary Reports.    I have reviewed the Nursing Notes.      Review of Systems  Anesthesia Hx:  No previous Anesthesia    Social:  Non-Smoker    Hematology/Oncology:         -- Anemia:   Cardiovascular:  Cardiovascular Normal Exercise tolerance: good     Pulmonary:  Pulmonary Normal    Hepatic/GI:   GERD    Musculoskeletal:  Musculoskeletal Normal    Neurological:  Neurology Normal    Endocrine:  Endocrine Normal      Lab Results   Component Value Date    WBC 6.63 08/22/2018    HGB 10.5 (L) 08/22/2018    HCT 30.8 (L) 08/22/2018     08/22/2018    ALT 15 08/02/2018    AST 15 08/02/2018     08/02/2018    K 3.7 08/02/2018     08/02/2018    CREATININE 0.6 08/02/2018    BUN 7 08/02/2018    CO2 23 08/02/2018     Wt Readings from Last 3 Encounters:   08/20/18 88 kg (194 lb)   08/17/18 88.9 kg (196 lb)   08/09/18 88 kg (194 lb)     Temp Readings from Last 3 Encounters:   08/22/18 36.8 °C (98.2 °F) (Oral)     BP Readings from Last 3 Encounters:   08/22/18 125/80   08/20/18 130/86   08/17/18 (!) 132/90     Pulse Readings from Last 3 Encounters:   08/22/18 72         Physical Exam  General:  Well nourished    Airway/Jaw/Neck:  AIRWAY FINDINGS: Normal      Chest/Lungs:  Chest/Lungs Clear    Heart/Vascular:  Heart Findings: Normal       Mental Status:  Mental Status Findings: Normal        Anesthesia Plan  Type of Anesthesia, risks & benefits discussed:  Anesthesia Type:  CSE  Patient's Preference:   Intra-op Monitoring Plan: standard ASA monitors  Intra-op Monitoring Plan Comments:   Post Op Pain Control Plan:   Post Op Pain Control Plan Comments:   Induction:    Beta Blocker:  Patient is not currently on a Beta-Blocker (No further documentation required).       Informed Consent: Patient understands  risks and agrees with Anesthesia plan.  Questions answered. Anesthesia consent signed with patient.  ASA Score: 2  emergent   Day of Surgery Review of History & Physical:            Ready For Surgery From Anesthesia Perspective.

## 2018-08-23 NOTE — PLAN OF CARE
Problem: Patient Care Overview  Goal: Plan of Care Review  Outcome: Ongoing (interventions implemented as appropriate)  1630 - assumed care of pt.  Vss, nad, denies pain at this time, tolerating regular diet.  Oriented pt and family member to room.  Poc: pain management as needed, po hydration and ambulation encouraged, continue to monitor.   offered, pt refused - requested that her sister in law interpret.  Reviewed poc w/pt via sister in law.  Pt verbalized understanding.

## 2018-08-23 NOTE — H&P
CC: induction of labor/contractions    HPI:   Micaela Johnson is a 33 y.o. female  at 39 5/7 EGA who presents here for IOL but reports she started having contractions early in the day coming every 2-3 minutes. No other complaints. This is her first pregnancy and she had most of her care out side the [place].     ROS:  GENERAL: Denies weight gain or weight loss. Feeling well overall.   SKIN: Denies rash or lesions.   HEAD: Denies head injury or headache.   CHEST: Denies chest pain or shortness of breath.   CARDIOVASCULAR: Denies palpitations or left sided chest pain.   ABDOMEN: No abdominal pain, constipation, diarrhea, nausea, vomiting or rectal bleeding.   URINARY: No frequency, dysuria, hematuria, or burning on urination.  REPRODUCTIVE: See HPI.     No past medical history on file.  No past surgical history on file.  No family history on file.  Allergies: Patient has no known allergies.  Social History     Socioeconomic History    Marital status:      Spouse name: Not on file    Number of children: Not on file    Years of education: Not on file    Highest education level: Not on file   Social Needs    Financial resource strain: Not on file    Food insecurity - worry: Not on file    Food insecurity - inability: Not on file    Transportation needs - medical: Not on file    Transportation needs - non-medical: Not on file   Occupational History    Not on file   Tobacco Use    Smoking status: Never Smoker    Smokeless tobacco: Never Used   Substance and Sexual Activity    Alcohol use: No    Drug use: No    Sexual activity: Not Currently   Other Topics Concern    Not on file   Social History Narrative    Not on file     [unfilled]       PE:   Temp:  [98.2 °F (36.8 °C)-98.8 °F (37.1 °C)]   Pulse:  [48-82]   Resp:  [16]   BP: (110-143)/(67-91)   SpO2:  [90 %-100 %]     APPEARANCE: Well nourished, well developed, in no acute distress.  CHEST: Lungs clear to auscultation.  HEART: Regular rate  and rhythm, no murmurs, rubs or gallops.  ABDOMEN:  Gravid.   SVE: 9/90/-1 per RN   /mod deshawn/+accel -decel   [place]: Q2-3 minutes     Assessment:  IUP 39 5/7 weeks  1. Labor: made cervical change on her own and SROM around 10pm. Hasn't needed augmentation.   2. Late care  3. Anemia: stable

## 2018-08-23 NOTE — L&D DELIVERY NOTE
[facility]  Vaginal Delivery   Operative Note    SUMMARY     Normal spontaneous vaginal delivery of live female infant, complicated by 1 minute shoulder dystocia. The head came out JAMARCUS over intact perineum and no nuchal cord was noted mom pushed however the shoulders didn't deliver so should dystocia was called. Mom was instructed to stop pushing. Wen and suprapubic were preformed by RN and right should delivered easily and body followed. Dystocia was under 1 minute.  Infant was vigorous and crying and moving all extremities well. She  was placed on mothers abdomen for skin to skin and bulb suctioning performed.    Spontaneous delivery of intact placenta and IV pitocin given noting good uterine tone  1st degree laceration noted that was repaired with 2-0 vircyl on CT and bilateral side walls that were repaired with 3-0 chromic on an SH.  Patient tolerated delivery well. Sponge needle and lap counted correctly x2. EBL: 400cc. . Infant was inspected and no right arm and hand was pink. Infant was moving both arms well with a good . Clavicle felt intact with no crepitus. Nursery was present and informed of should dystocia so infant could be further evaluated. Discussed the event with mom and sister in law, who patient requested to be her , and all questions answered. Mom and baby were in room recovering well.     Indications:  (normal spontaneous vaginal delivery)  Pregnancy complicated by:   Patient Active Problem List   Diagnosis    Gastroesophageal reflux disease without esophagitis    Nausea and vomiting    Late prenatal care     (normal spontaneous vaginal delivery)     Admitting GA: 39w5d    Delivery Information for  Girl Micaela Johnson    Birth information:  YOB: 2017   Time of birth: 8:34 AM   Sex: female   Head Delivery Date/Time: 2018  8:34 AM   Delivery type: Vaginal, Spontaneous Delivery   Gestational Age: 39w5d    Delivery Providers     Delivering clinician:  Diana Byrne MD   Provider Role    Antoinette Piña, RN Delivery Nurse    Karen Rose, RN Registered Nurse    Carole Parker, RN Registered Nurse            Measurements    Weight:  3173 g  Length:           Apgars    Living status:  Living  Apgars:   1 min.:   5 min.:   10 min.:   15 min.:   20 min.:     Skin color:   1  1       Heart rate:   2  2       Reflex irritability:   2  2       Muscle tone:   2  2       Respiratory effort:   2  2       Total:   9  9       Apgars assigned by:  JOSE F FALK         Operative Delivery    Forceps attempted?:  No  Vacuum extractor attempted?:  No         Shoulder Dystocia    Shoulder dystocia present?:  Yes  Time recognized:  2018 08:34:00  Help called by:  2nd RN in room for delivery   Physician/Provider:  In room for delivery   NICU staff:  In room for delivery   Additional staff:  N/A   Gentle attempt at traction, assisted by maternal expulsive forces?:  Yes   First maneuver:  Wen maneuver, suprapubic pressure  Time performed:  2018 08:34:00  Performed by:  Melita Valle  Time performed:  2018 08:34:00  Performed by:  Royal           Presentation    Presentation:  Vertex  Position:  Right Occiput Anterior           Interventions/Resuscitation    Method:  Bulb Suctioning, Tactile Stimulation       Cord    Vessels:  3 vessels  Complications:  None  Delayed Cord Clamping?:  No  Cord Clamped Date/Time:  2018  8:35 AM  Cord Blood Disposition:  Lab, Sent with Baby  Gases Sent?:  No  Stem Cell Collection (by MD):  No       Placenta    Placenta delivery date/time:  2018 0838  Placenta removal:  Spontaneous  Placenta appearance:  Intact  Placenta disposition:  discarded           Labor Events:       labor: No     Labor Onset Date/Time:         Dilation Complete Date/Time: 2018 07:55     Start Pushing Date/Time: 2018 08:05     Rupture Date/Time:              Rupture type:           Fluid Amount:         Fluid Color:        Fluid Odor:        Membrane Status (PeriCalm): SRM (Spontaneous Rupture)      Rupture Date/Time (PeriCalm): 2018 00:58:00      Fluid Amount (PeriCalm): Moderate      Fluid Color (PeriCalm): Clear       steroids: None     Antibiotics given for GBS: No     Induction: none     Indications for induction:        Augmentation:       Indications for augmentation:       Labor complications: Shoulder Dystocia     Additional complications:          Cervical ripening:                     Delivery:      Episiotomy: None     Indication for Episiotomy:       Perineal Lacerations: 1st Repaired:  Yes   Periurethral Laceration:   Repaired:     Labial Laceration: none Repaired:     Sulcus Laceration: none Repaired:     Vaginal Laceration: No Repaired:     Cervical Laceration: No Repaired:     Repair suture:       Repair # of packets: 2     Vaginal delivery QBL (mL):        QBL (mL): 0     Combined Blood Loss (mL): 0     Vaginal Sweep Performed: Yes     Surgicount Correct: Yes       Other providers:       Anesthesia    Method:  Spinal, Epidural          Details (if applicable):  Trial of Labor      Categorization:      Priority:     Indications for :     Incision Type:       Additional  information:  Forceps:    Vacuum:    Breech:    Observed anomalies    Other (Comments):

## 2018-08-24 VITALS
RESPIRATION RATE: 18 BRPM | WEIGHT: 193.81 LBS | HEART RATE: 77 BPM | BODY MASS INDEX: 34.34 KG/M2 | DIASTOLIC BLOOD PRESSURE: 84 MMHG | TEMPERATURE: 99 F | HEIGHT: 63 IN | OXYGEN SATURATION: 98 % | SYSTOLIC BLOOD PRESSURE: 119 MMHG

## 2018-08-24 PROBLEM — Z3A.38 38 WEEKS GESTATION OF PREGNANCY: Status: RESOLVED | Noted: 2018-08-23 | Resolved: 2018-08-24

## 2018-08-24 LAB
BASOPHILS # BLD AUTO: 0.03 K/UL
BASOPHILS NFR BLD: 0.3 %
DIFFERENTIAL METHOD: ABNORMAL
EOSINOPHIL # BLD AUTO: 0.1 K/UL
EOSINOPHIL NFR BLD: 1 %
ERYTHROCYTE [DISTWIDTH] IN BLOOD BY AUTOMATED COUNT: 12 %
HCT VFR BLD AUTO: 28.1 %
HGB BLD-MCNC: 9.6 G/DL
LYMPHOCYTES # BLD AUTO: 2.4 K/UL
LYMPHOCYTES NFR BLD: 22.6 %
MCH RBC QN AUTO: 30.9 PG
MCHC RBC AUTO-ENTMCNC: 34.2 G/DL
MCV RBC AUTO: 90 FL
MONOCYTES # BLD AUTO: 1.2 K/UL
MONOCYTES NFR BLD: 10.7 %
NEUTROPHILS # BLD AUTO: 7 K/UL
NEUTROPHILS NFR BLD: 65.1 %
PLATELET # BLD AUTO: 207 K/UL
PMV BLD AUTO: 9.7 FL
RBC # BLD AUTO: 3.11 M/UL
WBC # BLD AUTO: 10.77 K/UL

## 2018-08-24 PROCEDURE — 99238 HOSP IP/OBS DSCHRG MGMT 30/<: CPT | Mod: ,,, | Performed by: OBSTETRICS & GYNECOLOGY

## 2018-08-24 PROCEDURE — 25000003 PHARM REV CODE 250: Performed by: OBSTETRICS & GYNECOLOGY

## 2018-08-24 PROCEDURE — 85025 COMPLETE CBC W/AUTO DIFF WBC: CPT

## 2018-08-24 PROCEDURE — 36415 COLL VENOUS BLD VENIPUNCTURE: CPT

## 2018-08-24 RX ORDER — IBUPROFEN 600 MG/1
600 TABLET ORAL EVERY 6 HOURS PRN
Qty: 30 TABLET | Refills: 1 | Status: SHIPPED | OUTPATIENT
Start: 2018-08-24 | End: 2019-12-23

## 2018-08-24 RX ORDER — OXYCODONE AND ACETAMINOPHEN 5; 325 MG/1; MG/1
1 TABLET ORAL EVERY 4 HOURS PRN
Qty: 15 TABLET | Refills: 0 | Status: SHIPPED | OUTPATIENT
Start: 2018-08-24 | End: 2019-12-23

## 2018-08-24 RX ORDER — SIMETHICONE 80 MG
80 TABLET,CHEWABLE ORAL EVERY 6 HOURS PRN
Status: DISCONTINUED | OUTPATIENT
Start: 2018-08-24 | End: 2018-08-24 | Stop reason: HOSPADM

## 2018-08-24 RX ADMIN — OXYCODONE HYDROCHLORIDE AND ACETAMINOPHEN 1 TABLET: 5; 325 TABLET ORAL at 02:08

## 2018-08-24 RX ADMIN — IBUPROFEN 600 MG: 600 TABLET ORAL at 08:08

## 2018-08-24 RX ADMIN — DOCUSATE SODIUM 200 MG: 100 CAPSULE, LIQUID FILLED ORAL at 08:08

## 2018-08-24 RX ADMIN — OXYCODONE HYDROCHLORIDE AND ACETAMINOPHEN 1 TABLET: 5; 325 TABLET ORAL at 08:08

## 2018-08-24 RX ADMIN — SIMETHICONE CHEW TAB 80 MG 80 MG: 80 TABLET ORAL at 05:08

## 2018-08-24 NOTE — PLAN OF CARE
Problem: Patient Care Overview  Goal: Plan of Care Review  Outcome: Outcome(s) achieved Date Met: 08/24/18  Mom will do skin to skin & attempt to breastfeed frequently & on cue at least 8+ times/24 hrs.  Will monitor for signs of adequate fdg-active sucking/swallowing. Will avoid giving formula if BR well. Discussed benefits of BR; risks of FF; supply/demand. Assisted with position & latch. Good latch noted with assistance for closer position & deeper latch. Baby takes few sucks only then stops. Appears to be flow dependent. Encouraged mom to stimulate baby & to do skin to skin so that baby will actively suck. Mom stated that she is too tired to continue trying to BR right now. Discussed the need to continue to feed formula until baby is able to effectively BR-discussed signs to look for. Will have baby's weight checked at ped's office in the next couple of days.  Will call for any needs.

## 2018-08-24 NOTE — PROGRESS NOTES
2018  PPD 1    Patient is doing well with no complaints. Tolerating Po without N/V. Passing flatus. Ambulated without difficulty. Pain controlled with pain medications. Lochia is less than menses. Is breastfeeding. She would like to go home today. Denies HA/RUQ pain or scotoma.     Temp:  [97.8 °F (36.6 °C)-98.5 °F (36.9 °C)] 98.5 °F (36.9 °C)  Pulse:  [68-87] 78  Resp:  [16-20] 20  SpO2:  [91 %-100 %] 98 %  BP: (113-143)/(60-84) 122/82      In bed, NAD, RRR, CTA B, abd S/NT/ND + BS FF and below umbilicus, ext: no edema or tenderness     A/P: 33 y.o.   s/p  PPD 1   With anemia expected after procedure     1. Continue routine care, discussed with her it is not likely the baby will be discharge today but if she is they are ok to go home.

## 2018-08-24 NOTE — LACTATION NOTE
08/24/18 1425   Maternal Infant Assessment   Breast Density Bilateral:;soft   Areola Bilateral:;elastic   Nipple(s) Bilateral:;graspable;everted   Infant Assessment   Mouth Size average   Sucking Reflex present   Rooting Reflex present   Swallow Reflex present   Breasts WDL   Breasts WDL WDL   Pain/Comfort Assessments   Pain Assessment Performed Yes       Number Scale   Presence of Pain denies   Location - Side Bilateral   Location nipple(s)   Maternal Infant Feeding   Maternal Preparation breast care;hand hygiene   Maternal Emotional State assist needed;relaxed   Infant Positioning cradle;cross-cradle   Signs of Milk Transfer infant jaw motion present   Presence of Pain no   Comfort Measures Before/During Feeding infant position adjusted;latch adjusted;maternal position adjusted   Breast Milk Supply Volume (ml) (expresses drops of colostrum easily)   Time Spent (min) 30-60 min   Nipple Shape After Feeding, Left elongated   Latch Assistance yes   Breastfeeding Education adequate infant intake;adequate milk volume;diet;importance of skin-to-skin contact;increasing milk supply;label/storage of breast milk;medication effects;milk expression, hand;prenatal vitamins continued   Feeding Infant   Feeding Readiness Cues eager;rooting   Feeding Tolerance/Success adequate pause for breath;alert for feeding;arousal required;coordinated suck;coordinated swallow;suck inconsistent  (stimulation needed; flow dependent)   Effective Latch During Feeding no   Suck/Swallow Coordination present   Lactation Referrals   Lactation Consult Breast/nipple pain;Breastfeeding assessment;Follow up;Knowledge deficit   Lactation Referrals pediatric care provider   Lactation Follow-up Date/Time (Pediatric Care Provider) within 2-3 days   Lactation Interventions   Attachment Promotion breastfeeding assistance provided;counseling provided;face-to-face positioning promoted;family involvement promoted;privacy provided;skin-to-skin contact  encouraged   Breast Care: Breastfeeding milk massaged towards nipple   Breastfeeding Assistance assisted with positioning;feeding cue recognition promoted;feeding on demand promoted;feeding session observed;infant latch-on verified;infant stimulated to wakeful state;infant suck/swallow verified;milk expression/pumping;support offered   Maternal Breastfeeding Support diary/feeding log utilized;encouragement offered;lactation counseling provided;maternal hydration promoted;maternal nutrition promoted;maternal rest encouraged   Latch Promotion positioning assisted;infant moved to breast;suck stimulated with colostrum drop

## 2018-08-24 NOTE — DISCHARGE INSTRUCTIONS
"Patient Discharge Instructions for Postpartum Women    Resume Regular Diet  Increase activity gradually, no heavy lifting  Shower  No tampons, douching or sexual intercourse.  Discuss birth control options with your physician.  Wear a support bra  Return to work/school when you've been cleared by a physician    Call your physician if     *Fever of 100.4 or higher  *Persistent nausea/ vomiting  *Incisional drainage  *Heavy vaginal bleeding or large clots (Heavy bleeding is soaking 1 pad in an hour)  *Swelling and pain in arms or legs  *Severe headaches, blurred vision or fainting  *Shortness of breath  *Frequency and burning with urination  *Signs of postpartum depression, discuss these signs with your physician    Call lactation services for questions regarding feeding, nipple and breast care, and general questions about lactation.  They can be reached at [phone]         Understanding Postpartum Depression    You've just had a baby.  You know you should be excited and happy.  But instead you find yourself crying for no reason.  You may have trouble coping with your daily tasks.  You feel sad, tired, and hopeless most of the time.  You may even feel ashamed or guilty.  But what you're going through is not your fault and you can feel better.  Talk to your doctor.  He or she can help.    Depression After Childbirth    You may be weepy and tired right after giving birth.  These feelings are normal.  They're sometimes called the "baby blues."  These blues go away 2-3 weeks.  However, postpartum (meaning "after birth") depression lasts much longer and is more sever than the "baby blues."  It can make you feel sad and hopeless.  You may also fear that your baby will be harmed and worry about being a bad mother.      What is Depression?    Depression is a mood disorder that affects the way you think and feel.  The most common symptom is a feeling of deep sadness.  You may also feel as if you just can't cope with life.  "   Other symptoms include:      * Gaining or loosing weight  * Sleeping too much or too little  * Feeling tired all the time  * Feeling restless  * Fears of harming your baby   * Lack of interest in your baby  * Feeling worthless or guilty  * No longer finding pleasure in things you used to  * Having trouble thinking clearly or making decisions  * Thoughts of hurting yourself or your baby    What Causes Postpartum Depression    The exact causes of postpartum depression isn't known.  It may be due to changes in your hormones during and after childbirth.  You may also be tired from caring for your baby and adjusting to being a mother.  All these factors may make you feel depressed.  In some cases, your genes may also play a role.    Depression Can Be Treated    The good news is that there are many ways to treat postpartum depression.  Talking to your doctor is the first step toward feeling better.    Resources:    * National [place] of Mental Health  -- 490.186.6944    www.nimh.nih.gov    * National [place] on Mental Illness --103.112.7170    Www.magdy.org    * Mental Health Janie -- 874.839.3190     Www.[facility].org    * National Suicide Hotline --604.731.1620 (800-SUICIDE)    6186-4901 [employer]  All rights reserved.  This information is not intended as a substitute for professional medical care.  Always follow up with your healthcare professional's instructions.        Breastfeeding Discharge Instructions       Feed the baby at the earliest sign of hunger or comfort  o Hands to mouth, sucking motions  o Rooting or searching for something to suck on  o Dont wait for crying - it is a sign of distress     The feedings may be 8-12 times per 24hrs and will not follow a schedule   Avoid pacifiers and bottles for the first 4 weeks   Alternate the breast you start the feeding with, or start with the breast that feels the fullest   Switch breasts when the baby takes himself off the breast or falls  asleep   Keep offering breasts until the baby looks full, no longer gives hunger signs, and stays asleep when placed on his back in the crib   If the baby is sleepy and wont wake for a feeding, put the baby skin-to-skin dressed in a diaper against the mothers bare chest   Sleep near your baby   The baby should be positioned and latched on to the breast correctly  o Chest-to-chest, chin in the breast  o Babys lips are flipped outward  o Babys mouth is stretched open wide like a shout  o Babys sucking should feel like tugging to the mother  - The baby should be drinking at the breast:  o You should hear swallowing or gulping throughout the feeding  o You should see milk on the babys lips when he comes off the breast  o Your breasts should be softer when the baby is finished feeding  o The baby should look relaxed at the end of feedings  o After the 4th day and your milk is in:  o The babys poop should turn bright yellow and be loose, watery, and seedy  o The baby should have at least 3-4 poops the size of the palm of your hand per day  o The baby should have at least 5-6 wet diapers per day  o The urine should be light yellow in color  You should drink when you are thirsty and eat a healthy diet when you are    hungry.     Take naps to get the rest you need.   Take medications and/or drink alcohol only with permission of your obstetrician    or the babys pediatrician.  You can also call the [facility]ant Risk Center,   (974.761.2331), Monday-Friday, 8am-5pm Central time, to get the most   up-to-date evidence-based information on the use of medications during   pregnancy and breastfeeding.      The baby should be examined by a pediatrician at 3-5 days of age.  Once your   milk comes in, the baby should be gaining at least ½ - 1oz each day and should be back to birthweight no later than 10-14 days of age.          Community Resources    [facility] Breastfeeding Warmline: 276.619.1485  [facility]  clinics: provide incentives and breastpumps to eligible mothers  [employer] (LLLI):  mother-to-mother support group website        www.lll.org  [facility] [employer] mother-to-mother support groups:        www.llgalileoMagTag.Sopheon        [facility]   Dr. Dm Pressley website for latch videos and general information:        www.breastfeedinginc.ca  [facility] Risk [facility] is a call center that provides information about the safety of taking medications while breastfeeding.  Call 1-429.778.7328, M-F, 8am-5pm, [place].  International Lactation Consultant Association provides resources for assistance:        www.ilca.org  [facility] Breastfeeding Coalition provides informationand resources for parents  and the community    http://[facility].org     [employer] is a mom-to-mom support group:                             www.[employer].Sopheon//breastfeedng-support/  Partners for Healthy Babies:  9-404-511-BABY(2083)  [place] [employer] [place]: a breastfeeding support group for women of color, 464.119.7114

## 2018-08-24 NOTE — LACTATION NOTE
08/23/18 1715   Maternal Infant Assessment   Breast Density Bilateral:;soft   Nipple Symptoms bilateral:;other (see comments)  (denies redness or tenderness to nipples)   Infant Assessment   Mouth Size average   Pain/Comfort Assessments   Pain Assessment Performed Yes       Number Scale   Presence of Pain denies  (mom denies pain to breast or nipples)   Location - Side Bilateral   Location nipple(s)   Pain Rating: Rest 0   Pain Rating: Activity 0   Maternal Infant Feeding   Maternal Preparation breast care;hand hygiene   Maternal Emotional State relaxed   Infant Positioning (baby in crib sleeping)   Presence of Pain no   Time Spent (min) 15-30 min   Latch Assistance no  (does not want to BF while in hospital)   Breastfeeding Education adequate infant intake;adequate milk volume;importance of skin-to-skin contact;increasing milk supply;other (see comments)  (benefits of colostrum/BM for baby,s/d,BFG given)   Breastfeeding History   Breastfeeding History no  (first baby)   Feeding Infant   Satiety Cues sleeping after feeding   Skin-to-Skin Contact During Feeding (enc. s2s between fdgs.)   Lactation Referrals   Lactation Consult Initial assessment;Knowledge deficit   Lactation Referrals pediatric care provider   Lactation Interventions   Attachment Promotion counseling provided;environment adjusted;face-to-face positioning promoted;family involvement promoted;infant-mother separation minimized;privacy provided;role responsibility promoted;rooming-in promoted;skin-to-skin contact encouraged   Breastfeeding Assistance feeding on demand promoted;feeding cue recognition promoted;support offered   Maternal Breastfeeding Support diary/feeding log utilized;encouragement offered;infant-mother separation minimized;lactation counseling provided

## 2018-08-24 NOTE — PLAN OF CARE
Problem: Patient Care Overview  Goal: Plan of Care Review  Outcome: Ongoing (interventions implemented as appropriate)  0815 - vss, nad, pain well controlled w/po pain meds, tolerating regular diet, passing gas.  Poc: pain management as needed, ambulation encouraged, continue to monitor.  Reviewed poc w/pt's sister in law.  Pt verbalized understanding.    Offered an  but pt refused and requested that her sister in law interpret.    1515 - reviewed discharge instructions and meds w/pt via pt's sister in law.  D/c meds delivered to pt's room from outpatient pharmacy.  Pt verbalized understanding of d/c instructions and meds.  Pt demonstrates ability to care for herself and for infant.  Pt reports having help at home.  Vss, nad, pain well controlled w/po pain meds, tolerating regular diet, passing gas, appears to be bonding well w/infant upon discharge.

## 2018-08-24 NOTE — PLAN OF CARE
Problem: Patient Care Overview  Goal: Plan of Care Review  Outcome: Ongoing (interventions implemented as appropriate)  Patient alert, oriented x4. Denies pain or discomfort. Ambulates independently, voiding spontaneously without difficulty. Fundus firm, vaginal bleeding small in amount.  IV site clean dry and intact. Will continue to provide 8 or more feedings per 24 hr period.

## 2018-08-24 NOTE — DISCHARGE SUMMARY
Admitting Diagnosis: IUP at term, labor  Discharge Diagnosis: s/p    Procedure:   Service: OB-GYN, Diana Byrne   Consults: none   Hospital Course: Patient was admitted to L&D for labor. She had a vaginal delivery complicated by a 1 minute shoulder dystocia relieved with Wen and suprapubic pressure of a viable female infant.  She was transferred to [facility] baby in stable condition.  Her recovery was uncomplicated and by PPD 1. She was tolerating PO without N/V, ambulating without difficulty, her pain was well controlled with PO pain medications and she had passed flatus. She was breastfeeding. . She was stable and ready for discharge.   Medications: OTC ibuprofen, Percocet  Disposition: home   Condition: stable for discharge  Follow up: 6 week post partum visit  Instructions: Keep incision clean and dry, continue ambulation, take medications as needed and take stool softener as needed, pelvic rest until instructed otherwise by physician  Call or return to ED for fever >100.4,pre- eclampsia symptoms, foul smelling vaginal discharge, heavy vaginal bleeding, abdominal pain not responsive to medications or other concerns.

## 2018-08-27 NOTE — PHYSICIAN QUERY
"PT Name: Micaela Johnson  MR #: [id number]    Physician Query Form - Hematology Clarification      CDS/: MELIDA Day,RNC-MNN           Contact information:[email or web address]    This form is a permanent document in the medical record.      Query Date: August 27, 2018    By submitting this query, we are merely seeking further clarification of documentation. Please utilize your independent clinical judgment when addressing the question(s) below.    The Medical record contains the following:   Indicators  Supporting Clinical Findings Location in Medical Record   X "Anemia" documented With anemia expected after procedure  OB Progress note 8/24@757am   X H & H = Hgb=9.6-10.5  Hct=28.1-30.8 LAB 8/22-8/24    BP =                     HR=      "GI bleeding" documented     X Acute bleeding (Non GI site) EBL: 400cc   L&D Delivery note 8/23    Transfusion(s)      Treatment:     X Other:  Normal spontaneous vaginal delivery of live female infant L&D Delivery note 8/23     Provider, please specify diagnosis or diagnoses associated with above clinical findings.    [  x] Acute blood loss anemia  [  ] Other Hematological Diagnosis (please specify): _________________________________  [  ] Clinically Undetermined       Please document in your progress notes daily for the duration of treatment, until resolved, and include in your discharge summary.                                                                                                      "

## 2019-12-10 ENCOUNTER — TELEPHONE (OUTPATIENT)
Dept: OBSTETRICS AND GYNECOLOGY | Facility: CLINIC | Age: 34
End: 2019-12-10

## 2019-12-10 NOTE — TELEPHONE ENCOUNTER
----- Message from Essence Leonardo sent at 12/10/2019  1:26 PM CST -----  Contact: self / [phone]  Patient needs to be seen she is 8 months pregnant. Please advise

## 2019-12-19 ENCOUNTER — LAB VISIT (OUTPATIENT)
Dept: LAB | Facility: HOSPITAL | Age: 34
End: 2019-12-19
Attending: OBSTETRICS & GYNECOLOGY
Payer: MEDICAID

## 2019-12-19 ENCOUNTER — OFFICE VISIT (OUTPATIENT)
Dept: OBSTETRICS AND GYNECOLOGY | Facility: CLINIC | Age: 34
End: 2019-12-19
Payer: MEDICAID

## 2019-12-19 ENCOUNTER — TELEPHONE (OUTPATIENT)
Dept: OBSTETRICS AND GYNECOLOGY | Facility: CLINIC | Age: 34
End: 2019-12-19

## 2019-12-19 ENCOUNTER — PROCEDURE VISIT (OUTPATIENT)
Dept: OBSTETRICS AND GYNECOLOGY | Facility: CLINIC | Age: 34
End: 2019-12-19
Payer: MEDICAID

## 2019-12-19 VITALS
SYSTOLIC BLOOD PRESSURE: 120 MMHG | WEIGHT: 197.75 LBS | DIASTOLIC BLOOD PRESSURE: 80 MMHG | HEIGHT: 63 IN | BODY MASS INDEX: 35.04 KG/M2

## 2019-12-19 DIAGNOSIS — Z32.01 POSITIVE PREGNANCY TEST: Primary | ICD-10-CM

## 2019-12-19 DIAGNOSIS — Z32.01 POSITIVE PREGNANCY TEST: ICD-10-CM

## 2019-12-19 DIAGNOSIS — Z3A.38 38 WEEKS GESTATION OF PREGNANCY: ICD-10-CM

## 2019-12-19 DIAGNOSIS — Z36.89 ENCOUNTER FOR FETAL ANATOMIC SURVEY: ICD-10-CM

## 2019-12-19 LAB
ABO + RH BLD: NORMAL
BLD GP AB SCN CELLS X3 SERPL QL: NORMAL
GLUCOSE SERPL-MCNC: 89 MG/DL (ref 70–140)

## 2019-12-19 PROCEDURE — 36415 COLL VENOUS BLD VENIPUNCTURE: CPT

## 2019-12-19 PROCEDURE — 87801 DETECT AGNT MULT DNA AMPLI: CPT | Mod: 59

## 2019-12-19 PROCEDURE — 86703 HIV-1/HIV-2 1 RESULT ANTBDY: CPT

## 2019-12-19 PROCEDURE — 86762 RUBELLA ANTIBODY: CPT

## 2019-12-19 PROCEDURE — 99203 PR OFFICE/OUTPT VISIT, NEW, LEVL III, 30-44 MIN: ICD-10-PCS | Mod: S$PBB,TH,, | Performed by: OBSTETRICS & GYNECOLOGY

## 2019-12-19 PROCEDURE — 87081 CULTURE SCREEN ONLY: CPT

## 2019-12-19 PROCEDURE — 76805 PR US, OB 14+WKS, TRANSABD, SINGLE GESTATION: ICD-10-PCS | Mod: 26,S$PBB,, | Performed by: OBSTETRICS & GYNECOLOGY

## 2019-12-19 PROCEDURE — 99213 OFFICE O/P EST LOW 20 MIN: CPT | Mod: PBBFAC,TH,PO | Performed by: OBSTETRICS & GYNECOLOGY

## 2019-12-19 PROCEDURE — 99203 OFFICE O/P NEW LOW 30 MIN: CPT | Mod: S$PBB,TH,, | Performed by: OBSTETRICS & GYNECOLOGY

## 2019-12-19 PROCEDURE — 87481 CANDIDA DNA AMP PROBE: CPT | Mod: 59

## 2019-12-19 PROCEDURE — 99999 PR PBB SHADOW E&M-EST. PATIENT-LVL III: ICD-10-PCS | Mod: PBBFAC,,, | Performed by: OBSTETRICS & GYNECOLOGY

## 2019-12-19 PROCEDURE — 86850 RBC ANTIBODY SCREEN: CPT

## 2019-12-19 PROCEDURE — 76805 OB US >/= 14 WKS SNGL FETUS: CPT | Mod: 26,S$PBB,, | Performed by: OBSTETRICS & GYNECOLOGY

## 2019-12-19 PROCEDURE — 99999 PR PBB SHADOW E&M-EST. PATIENT-LVL III: CPT | Mod: PBBFAC,,, | Performed by: OBSTETRICS & GYNECOLOGY

## 2019-12-19 PROCEDURE — 87086 URINE CULTURE/COLONY COUNT: CPT

## 2019-12-19 PROCEDURE — 76805 OB US >/= 14 WKS SNGL FETUS: CPT | Mod: PBBFAC,PO | Performed by: OBSTETRICS & GYNECOLOGY

## 2019-12-19 PROCEDURE — 87491 CHLMYD TRACH DNA AMP PROBE: CPT

## 2019-12-19 PROCEDURE — 82950 GLUCOSE TEST: CPT

## 2019-12-19 PROCEDURE — 86592 SYPHILIS TEST NON-TREP QUAL: CPT

## 2019-12-19 PROCEDURE — 87340 HEPATITIS B SURFACE AG IA: CPT

## 2019-12-19 RX ORDER — TERCONAZOLE 4 MG/G
1 CREAM VAGINAL DAILY
Qty: 1 TUBE | Refills: 1 | Status: ON HOLD | OUTPATIENT
Start: 2019-12-19 | End: 2020-01-21 | Stop reason: HOSPADM

## 2019-12-19 RX ORDER — FERROUS SULFATE, DRIED 160(50) MG
1 TABLET, EXTENDED RELEASE ORAL DAILY
COMMUNITY

## 2019-12-19 NOTE — TELEPHONE ENCOUNTER
Can you please let her know that based on our ultrasound today the baby is measuring smaller than the due date she gave us. Today they are measuring 34 weeks and 3 days. This would give her a due date of 1/29/20. Since we are starting her care here late and have late ultrasounds then I would recommend starting NSTs weekly. Can you please set her up for NSTs starting next week for the next 4 weeks? Thanks

## 2019-12-19 NOTE — PROGRESS NOTES
CC: positive pregnancy test     HPI:   Micaela Johnson 34 y.o.  at 38 1/7 wga by stated EDC which is based on her lmp is here to establish care. [place] She c.o vaginal itching and irritation.     OB history: 19  of VFI complicated by 1 minute shoulder dystocia 3173 gm, 7lb, infant with no defects      No LMP recorded.     No past medical history on file.    No past surgical history on file.    No family history on file.    Social History     Socioeconomic History    Marital status:      Spouse name: Not on file    Number of children: Not on file    Years of education: Not on file    Highest education level: Not on file   Occupational History    Not on file   Social Needs    Financial resource strain: Not on file    Food insecurity:     Worry: Not on file     Inability: Not on file    Transportation needs:     Medical: Not on file     Non-medical: Not on file   Tobacco Use    Smoking status: Never Smoker    Smokeless tobacco: Never Used   Substance and Sexual Activity    Alcohol use: No    Drug use: No    Sexual activity: Not Currently   Lifestyle    Physical activity:     Days per week: Not on file     Minutes per session: Not on file    Stress: Not on file   Relationships    Social connections:     Talks on phone: Not on file     Gets together: Not on file     Attends Shinto service: Not on file     Active member of club or organization: Not on file     Attends meetings of clubs or organizations: Not on file     Relationship status: Not on file   Other Topics Concern    Not on file   Social History Narrative    Not on file       OB History        1    Para   1    Term   1       0    AB   0    Living   1       SAB   0    TAB   0    Ectopic   0    Multiple   0    Live Births   1                  COMPREHENSIVE GYN HISTORY:  PAP History: Denies abnormal Paps.  Infection History: Denies STDs. Denies PID.  Benign  History: Denies uterine fibroids. Denies ovarian cysts. Denies endometriosis.   Cancer History: Denies cervical cancer. Denies uterine cancer or hyperplasia. Denies ovarian cancer. Denies vulvar cancer or pre-cancer. Denies vaginal cancer or pre-cancer. Denies breast cancer. Denies colon cancer.  Sexual Activity History:   reports that she does not currently engage in sexual activity.   Menstrual History: Age of menarche: 13 years. Every mon, flows for 5 days.       ROS:  GENERAL: Denies weight gain or weight loss. Feeling well overall.   SKIN: Denies rash or lesions.   HEAD: Denies headache.   NODES: Denies enlarged lymph nodes.   CHEST: Denies shortness of breath or chest pain   ABDOMEN: No abdominal pain, constipation, diarrhea, nausea, vomiting or rectal bleeding.   URINARY: No frequency, dysuria, hematuria, or burning on urination.  REPRODUCTIVE: See HPI.   BREASTS: The patient denies pain, lumps, or nipple discharge.   HEMATOLOGIC: No easy bruisability.   MUSCULOSKELETAL: Denies joint pain or back pain.   NEUROLOGIC: Denies weakness.   PSYCHIATRIC: Denies depression, anxiety or mood swings.    PE:   There were no vitals taken for this visit.    APPEARANCE: Well nourished, well developed, in no acute distress.  NECK: Neck symmetric without  thyromegaly.  NODES: No inguinal, cervical lymph node enlargement.  CHEST: Lungs clear to auscultation.  HEART: Regular rate and rhythm, no murmurs, rubs or gallops.  ABDOMEN: Soft. No tenderness or masses. No hernias.  Pelvic: normal vaginal mucosa, scant vaginal discharge, cervix closed    Fundal Height: 37  FHT: 155        1. Positive pregnancy test        Plan:    1. IOB labs and dM test and dating and anatomy US ordered, PNV ordered.   2 Discussed with patient the way the office works. That we are 1/2 men and women. I will do my best to be there at the delivery but if I can't make it that there would be another physician there who could care for them.   3. Discussed h/o  shoulder dystocia and risk of recurrence is difficult to estimate, may be as high at 20% but likely somewhere around 10%, we discussed that can result in significant injury to infant when occurs so could proceed with PLTCD if she desires especially if the infant is measuring large. She is going to wait for US but likely would like vaginal delivery. Will signs consents next visit  4. Discussed Tdap next visit

## 2019-12-20 LAB
C TRACH DNA SPEC QL NAA+PROBE: NOT DETECTED
HBV SURFACE AG SERPL QL IA: NEGATIVE
HIV 1+2 AB+HIV1 P24 AG SERPL QL IA: NEGATIVE
N GONORRHOEA DNA SPEC QL NAA+PROBE: NOT DETECTED
RPR SER QL: NORMAL
RUBV IGG SER-ACNC: 296 IU/ML
RUBV IGG SER-IMP: REACTIVE

## 2019-12-21 LAB
BACTERIAL VAGINOSIS DNA: NEGATIVE
CANDIDA GLABRATA DNA: NEGATIVE
CANDIDA KRUSEI DNA: NEGATIVE
CANDIDA RRNA VAG QL PROBE: NEGATIVE
T VAGINALIS RRNA GENITAL QL PROBE: NEGATIVE

## 2019-12-22 LAB — BACTERIA UR CULT: NORMAL

## 2019-12-23 ENCOUNTER — ROUTINE PRENATAL (OUTPATIENT)
Dept: OBSTETRICS AND GYNECOLOGY | Facility: CLINIC | Age: 34
End: 2019-12-23
Payer: MEDICAID

## 2019-12-23 VITALS
WEIGHT: 198.19 LBS | DIASTOLIC BLOOD PRESSURE: 80 MMHG | BODY MASS INDEX: 35.56 KG/M2 | SYSTOLIC BLOOD PRESSURE: 124 MMHG

## 2019-12-23 DIAGNOSIS — Z3A.35 35 WEEKS GESTATION OF PREGNANCY: Primary | ICD-10-CM

## 2019-12-23 LAB — BACTERIA SPEC AEROBE CULT: NORMAL

## 2019-12-23 PROCEDURE — 99999 PR PBB SHADOW E&M-EST. PATIENT-LVL II: ICD-10-PCS | Mod: PBBFAC,,, | Performed by: OBSTETRICS & GYNECOLOGY

## 2019-12-23 PROCEDURE — 99212 PR OFFICE/OUTPT VISIT, EST, LEVL II, 10-19 MIN: ICD-10-PCS | Mod: TH,S$PBB,, | Performed by: OBSTETRICS & GYNECOLOGY

## 2019-12-23 PROCEDURE — 99999 PR PBB SHADOW E&M-EST. PATIENT-LVL II: CPT | Mod: PBBFAC,,, | Performed by: OBSTETRICS & GYNECOLOGY

## 2019-12-23 PROCEDURE — 99212 OFFICE O/P EST SF 10 MIN: CPT | Mod: PBBFAC,PO | Performed by: OBSTETRICS & GYNECOLOGY

## 2019-12-23 PROCEDURE — 99212 OFFICE O/P EST SF 10 MIN: CPT | Mod: TH,S$PBB,, | Performed by: OBSTETRICS & GYNECOLOGY

## 2019-12-23 NOTE — PROGRESS NOTES
@ 35 wga   1. Per patient she is suppose to be around 39 wga based on ultrasound done in another country though we don't have those records so M recommend antepartum testing and repeat US in 4 weeks.    2. Missed CBC on labs, will order  3. paragard ordered  4. Consents signs, unable to tell gender on US but desires circ if boy  5. H/o shoulder dystocia without residual defect, desires

## 2020-01-03 ENCOUNTER — ROUTINE PRENATAL (OUTPATIENT)
Dept: OBSTETRICS AND GYNECOLOGY | Facility: CLINIC | Age: 35
End: 2020-01-03
Payer: MEDICAID

## 2020-01-03 ENCOUNTER — HOSPITAL ENCOUNTER (OUTPATIENT)
Dept: OBSTETRICS AND GYNECOLOGY | Facility: HOSPITAL | Age: 35
Discharge: HOME | End: 2020-01-03
Attending: OBSTETRICS & GYNECOLOGY
Payer: MEDICAID

## 2020-01-03 VITALS
DIASTOLIC BLOOD PRESSURE: 76 MMHG | SYSTOLIC BLOOD PRESSURE: 120 MMHG | WEIGHT: 201.25 LBS | BODY MASS INDEX: 36.11 KG/M2

## 2020-01-03 DIAGNOSIS — O09.30 LATE PRENATAL CARE: Primary | ICD-10-CM

## 2020-01-03 DIAGNOSIS — Z3A.36 36 WEEKS GESTATION OF PREGNANCY: Primary | ICD-10-CM

## 2020-01-03 PROCEDURE — 99212 OFFICE O/P EST SF 10 MIN: CPT | Mod: TH,S$PBB,, | Performed by: OBSTETRICS & GYNECOLOGY

## 2020-01-03 PROCEDURE — 99999 PR PBB SHADOW E&M-EST. PATIENT-LVL II: ICD-10-PCS | Mod: PBBFAC,,, | Performed by: OBSTETRICS & GYNECOLOGY

## 2020-01-03 PROCEDURE — 99999 PR PBB SHADOW E&M-EST. PATIENT-LVL II: CPT | Mod: PBBFAC,,, | Performed by: OBSTETRICS & GYNECOLOGY

## 2020-01-03 PROCEDURE — 59025 FETAL NON-STRESS TEST: CPT

## 2020-01-03 PROCEDURE — 99212 PR OFFICE/OUTPT VISIT, EST, LEVL II, 10-19 MIN: ICD-10-PCS | Mod: TH,S$PBB,, | Performed by: OBSTETRICS & GYNECOLOGY

## 2020-01-03 PROCEDURE — 99212 OFFICE O/P EST SF 10 MIN: CPT | Mod: PBBFAC,25,PO | Performed by: OBSTETRICS & GYNECOLOGY

## 2020-01-03 NOTE — NURSING
Ob Pre Reg done -  (sister in law) ok to use per pt. Witnessed and explained consents, baby friendly procedures. Pt delivered here 18 mos ago per DIVINA. Verbalized understanding

## 2020-01-03 NOTE — PROGRESS NOTES
@ 36 4/ wga   1. Per patient she is suppose to be around 39 wga based on ultrasound done in another country though we don't have those records so [facility] recommend antepartum testing and repeat US in 4 weeks. Patient wanted repeat US today to tell gender, discussed with her that unfortunately we can't do US every time we see her and that when we do ultrasounds late in pregnancy sometimes we can't see what we need to.    2. Missed CBC on labs, will order  3. paragard ordered  4. Consents signs, unable to tell gender on US but desires circ if boy  5. H/o shoulder dystocia without residual defect, counseled on risk of repeat dystocia and expresses her understanding, declines official  and desires

## 2020-01-09 ENCOUNTER — TELEPHONE (OUTPATIENT)
Dept: OBSTETRICS AND GYNECOLOGY | Facility: CLINIC | Age: 35
End: 2020-01-09

## 2020-01-09 ENCOUNTER — HOSPITAL ENCOUNTER (OUTPATIENT)
Dept: OBSTETRICS AND GYNECOLOGY | Facility: HOSPITAL | Age: 35
Discharge: HOME | End: 2020-01-09
Attending: OBSTETRICS & GYNECOLOGY
Payer: MEDICAID

## 2020-01-09 ENCOUNTER — ROUTINE PRENATAL (OUTPATIENT)
Dept: OBSTETRICS AND GYNECOLOGY | Facility: CLINIC | Age: 35
End: 2020-01-09
Payer: MEDICAID

## 2020-01-09 VITALS
HEART RATE: 83 BPM | DIASTOLIC BLOOD PRESSURE: 90 MMHG | WEIGHT: 203.06 LBS | DIASTOLIC BLOOD PRESSURE: 76 MMHG | BODY MASS INDEX: 36.43 KG/M2 | SYSTOLIC BLOOD PRESSURE: 130 MMHG | SYSTOLIC BLOOD PRESSURE: 131 MMHG

## 2020-01-09 DIAGNOSIS — O16.1 ELEVATED BLOOD PRESSURE AFFECTING PREGNANCY IN FIRST TRIMESTER, ANTEPARTUM: Primary | ICD-10-CM

## 2020-01-09 DIAGNOSIS — O09.30 LATE PRENATAL CARE: ICD-10-CM

## 2020-01-09 DIAGNOSIS — Z3A.37 37 WEEKS GESTATION OF PREGNANCY: ICD-10-CM

## 2020-01-09 PROCEDURE — 99999 PR PBB SHADOW E&M-EST. PATIENT-LVL II: ICD-10-PCS | Mod: PBBFAC,,, | Performed by: OBSTETRICS & GYNECOLOGY

## 2020-01-09 PROCEDURE — 59025 FETAL NON-STRESS TEST: CPT

## 2020-01-09 PROCEDURE — 99212 PR OFFICE/OUTPT VISIT, EST, LEVL II, 10-19 MIN: ICD-10-PCS | Mod: TH,S$PBB,25, | Performed by: OBSTETRICS & GYNECOLOGY

## 2020-01-09 PROCEDURE — 59025 FETAL NON-STRESS TEST: CPT | Mod: 26,,, | Performed by: OBSTETRICS & GYNECOLOGY

## 2020-01-09 PROCEDURE — 99999 PR PBB SHADOW E&M-EST. PATIENT-LVL II: CPT | Mod: PBBFAC,,, | Performed by: OBSTETRICS & GYNECOLOGY

## 2020-01-09 PROCEDURE — 99212 OFFICE O/P EST SF 10 MIN: CPT | Mod: PBBFAC,25,TH,PO | Performed by: OBSTETRICS & GYNECOLOGY

## 2020-01-09 PROCEDURE — 59025 PR FETAL 2N-STRESS TEST: ICD-10-PCS | Mod: 26,,, | Performed by: OBSTETRICS & GYNECOLOGY

## 2020-01-09 PROCEDURE — 99212 OFFICE O/P EST SF 10 MIN: CPT | Mod: TH,S$PBB,25, | Performed by: OBSTETRICS & GYNECOLOGY

## 2020-01-09 RX ORDER — NYSTATIN 100000 U/G
CREAM TOPICAL 2 TIMES DAILY
Qty: 30 G | Refills: 0 | Status: ON HOLD | OUTPATIENT
Start: 2020-01-09 | End: 2020-01-21 | Stop reason: HOSPADM

## 2020-01-09 RX ORDER — TRIAMCINOLONE ACETONIDE 1 MG/G
CREAM TOPICAL 2 TIMES DAILY
Qty: 30 G | Refills: 0 | Status: ON HOLD | OUTPATIENT
Start: 2020-01-09 | End: 2020-01-21 | Stop reason: HOSPADM

## 2020-01-09 RX ORDER — FERROUS SULFATE 325(65) MG
325 TABLET, DELAYED RELEASE (ENTERIC COATED) ORAL 2 TIMES DAILY
Qty: 60 TABLET | Refills: 3 | Status: SHIPPED | OUTPATIENT
Start: 2020-01-09 | End: 2021-01-08

## 2020-01-09 NOTE — TELEPHONE ENCOUNTER
Called to inform pt that she is anemic and that Dr Byrne sent in iron tablets for her to take. Pt verbalized understanding.

## 2020-01-09 NOTE — PROGRESS NOTES
Procedure: NST   Indication: late prenatal care  @ 37 3/7  Baseline: 140  Variability: moderate  Accelerations: yes  Declarations:  no    Impression: reactive NST

## 2020-01-09 NOTE — PROGRESS NOTES
@ 37 3/ wga   1. Per patient she is suppose to be around 39 wga based on ultrasound done in another country though we don't have those records so [facility] recommend antepartum testing and repeat US in 4 weeks. Patient wanted repeat US today to tell gender, discussed with her that unfortunately we can't do US every time we see her and that when we do ultrasounds late in pregnancy sometimes we can't see what we need to.    -NST reactive   2. Missed CBC on labs, will order  3. paragard ordered  4. Consents signs, unable to tell gender on US but desires circ if boy  5. H/o shoulder dystocia without residual defect, counseled on risk of repeat dystocia and expresses her understanding, declines official  and desires   - Bps were normal during NST and diastolic was 90 now, no complaints, recheck was normal, pre-e precautions give, will do labs today

## 2020-01-16 ENCOUNTER — HOSPITAL ENCOUNTER (OUTPATIENT)
Dept: OBSTETRICS AND GYNECOLOGY | Facility: HOSPITAL | Age: 35
Discharge: HOME | End: 2020-01-16
Attending: OBSTETRICS & GYNECOLOGY
Payer: MEDICAID

## 2020-01-16 ENCOUNTER — HOSPITAL ENCOUNTER (OUTPATIENT)
Facility: HOSPITAL | Age: 35
LOS: 1 days | Discharge: HOME | End: 2020-01-16
Attending: OBSTETRICS & GYNECOLOGY | Admitting: OBSTETRICS & GYNECOLOGY
Payer: MEDICAID

## 2020-01-16 VITALS — BODY MASS INDEX: 35.98 KG/M2 | HEIGHT: 63 IN | WEIGHT: 203.06 LBS

## 2020-01-16 DIAGNOSIS — O28.8 NON-REACTIVE NST (NON-STRESS TEST): ICD-10-CM

## 2020-01-16 DIAGNOSIS — O09.30 LATE PRENATAL CARE: ICD-10-CM

## 2020-01-16 PROCEDURE — 59025 PR FETAL 2N-STRESS TEST: ICD-10-PCS | Mod: 26,,, | Performed by: OBSTETRICS & GYNECOLOGY

## 2020-01-16 PROCEDURE — 59025 FETAL NON-STRESS TEST: CPT | Mod: 59

## 2020-01-16 PROCEDURE — 59025 FETAL NON-STRESS TEST: CPT | Mod: 26,,, | Performed by: OBSTETRICS & GYNECOLOGY

## 2020-01-16 PROCEDURE — 99211 OFF/OP EST MAY X REQ PHY/QHP: CPT | Mod: 25

## 2020-01-16 NOTE — NURSING
Dr Byrne assessed patient at bedside, okay for discharge, educated on kick counts and s/s to f/u with by MD. Patient and family member ambulated from unit without further difficulty.

## 2020-01-16 NOTE — NURSING
Spoke with Dr Byrne, patient had appt today in clinic, MD will come to unit to assess patient before d/c

## 2020-01-16 NOTE — PROGRESS NOTES
Procedure: NST   Indication: limited PNC   @ 38 wga  Baseline: 140  Variability: moderate  Accelerations: yes  Declarations:  Yes, 2 variable decelerations     Impression: reactive NST but sent to L&D for BPP to check fluid with variable decelerations. BPP was 8/8. Patient asked for IOL but most dates full next week. Will call if spot opens up.

## 2020-01-16 NOTE — NURSING
Received report from Jenifer on antepartum,  34 year old G 2 P 1 at 38.3 wks with variables at NST. Dr Byrne requesting BPP.    History/complications of  x1. Denies vaginal bleeding, denies leaking fluid. Fetus: fetal heart tones 140, positive fetal movements. Contractions none noted from pt. Abdomen soft, non tender. Vital signs WNL Cervix: not examined .Educated on electronic fetal monitoring and assessments. Questions answered. Will update Dr. Byrne.

## 2020-01-17 ENCOUNTER — TELEPHONE (OUTPATIENT)
Dept: OBSTETRICS AND GYNECOLOGY | Facility: CLINIC | Age: 35
End: 2020-01-17

## 2020-01-17 NOTE — TELEPHONE ENCOUNTER
Spoke with patient and will be in the to be induced at date and time Dr. Byrne instructed.  Patient verbalized understanding.

## 2020-01-17 NOTE — TELEPHONE ENCOUNTER
Can you please call her and tell her that a spot opened up for an induction of labor next week on 1/22/20 Wednesday at midnight (so early in am on Wednesday or very late on Tuesday, however she wants to think about it). She can eat before the induction and should go to L&D 3rd floor at midnight to check in. Let me know if you or she has any questions. If you can't reach her then we need to call until we reach her or let me know.

## 2020-01-20 ENCOUNTER — HOSPITAL ENCOUNTER (INPATIENT)
Facility: HOSPITAL | Age: 35
LOS: 1 days | Discharge: HOME | End: 2020-01-21
Attending: OBSTETRICS & GYNECOLOGY | Admitting: OBSTETRICS & GYNECOLOGY
Payer: MEDICAID

## 2020-01-20 DIAGNOSIS — Z34.90 PREGNANCY: ICD-10-CM

## 2020-01-20 PROBLEM — O28.8 NON-REACTIVE NST (NON-STRESS TEST): Status: RESOLVED | Noted: 2020-01-16 | Resolved: 2020-01-20

## 2020-01-20 PROBLEM — R11.2 NAUSEA AND VOMITING: Status: RESOLVED | Noted: 2018-06-27 | Resolved: 2020-01-20

## 2020-01-20 LAB
ABO + RH BLD: NORMAL
BASOPHILS # BLD AUTO: 0.02 K/UL (ref 0–0.2)
BASOPHILS NFR BLD: 0.2 % (ref 0–1.9)
BLD GP AB SCN CELLS X3 SERPL QL: NORMAL
DIFFERENTIAL METHOD: ABNORMAL
EOSINOPHIL # BLD AUTO: 0.1 K/UL (ref 0–0.5)
EOSINOPHIL NFR BLD: 0.8 % (ref 0–8)
ERYTHROCYTE [DISTWIDTH] IN BLOOD BY AUTOMATED COUNT: 12.1 % (ref 11.5–14.5)
HCT VFR BLD AUTO: 33.7 % (ref 37–48.5)
HGB BLD-MCNC: 11 G/DL (ref 12–16)
LYMPHOCYTES # BLD AUTO: 2 K/UL (ref 1–4.8)
LYMPHOCYTES NFR BLD: 22.9 % (ref 18–48)
MCH RBC QN AUTO: 29.1 PG (ref 27–31)
MCHC RBC AUTO-ENTMCNC: 32.6 G/DL (ref 32–36)
MCV RBC AUTO: 89 FL (ref 82–98)
MONOCYTES # BLD AUTO: 0.8 K/UL (ref 0.3–1)
MONOCYTES NFR BLD: 9 % (ref 4–15)
NEUTROPHILS # BLD AUTO: 5.8 K/UL (ref 1.8–7.7)
NEUTROPHILS NFR BLD: 67.1 % (ref 38–73)
PLATELET # BLD AUTO: 288 K/UL (ref 150–350)
PMV BLD AUTO: 9.7 FL (ref 9.2–12.9)
RBC # BLD AUTO: 3.78 M/UL (ref 4–5.4)
RPR SER QL: NORMAL
RUPTURE OF MEMBRANE: POSITIVE
WBC # BLD AUTO: 8.74 K/UL (ref 3.9–12.7)

## 2020-01-20 PROCEDURE — 11000001 HC ACUTE MED/SURG PRIVATE ROOM

## 2020-01-20 PROCEDURE — 59409 OBSTETRICAL CARE: CPT | Mod: GB,,, | Performed by: OBSTETRICS & GYNECOLOGY

## 2020-01-20 PROCEDURE — 25000003 PHARM REV CODE 250

## 2020-01-20 PROCEDURE — 36415 COLL VENOUS BLD VENIPUNCTURE: CPT

## 2020-01-20 PROCEDURE — 72200004 HC VAGINAL DELIVERY LEVEL I

## 2020-01-20 PROCEDURE — 84112 EVAL AMNIOTIC FLUID PROTEIN: CPT

## 2020-01-20 PROCEDURE — 63600175 PHARM REV CODE 636 W HCPCS: Performed by: OBSTETRICS & GYNECOLOGY

## 2020-01-20 PROCEDURE — 86901 BLOOD TYPING SEROLOGIC RH(D): CPT

## 2020-01-20 PROCEDURE — 99211 OFF/OP EST MAY X REQ PHY/QHP: CPT

## 2020-01-20 PROCEDURE — 72100002 HC LABOR CARE, 1ST 8 HOURS

## 2020-01-20 PROCEDURE — 85025 COMPLETE CBC W/AUTO DIFF WBC: CPT

## 2020-01-20 PROCEDURE — 25000003 PHARM REV CODE 250: Performed by: OBSTETRICS & GYNECOLOGY

## 2020-01-20 PROCEDURE — 59409 PR OBSTETRICAL CARE,VAG DELIV ONLY: ICD-10-PCS | Mod: GB,,, | Performed by: OBSTETRICS & GYNECOLOGY

## 2020-01-20 PROCEDURE — 86592 SYPHILIS TEST NON-TREP QUAL: CPT

## 2020-01-20 RX ORDER — DIPHENHYDRAMINE HCL 25 MG
25 CAPSULE ORAL EVERY 4 HOURS PRN
Status: DISCONTINUED | OUTPATIENT
Start: 2020-01-20 | End: 2020-01-21 | Stop reason: HOSPADM

## 2020-01-20 RX ORDER — ACETAMINOPHEN 325 MG/1
650 TABLET ORAL EVERY 6 HOURS PRN
Status: DISCONTINUED | OUTPATIENT
Start: 2020-01-20 | End: 2020-01-21 | Stop reason: HOSPADM

## 2020-01-20 RX ORDER — OXYTOCIN/RINGER'S LACTATE 30/500 ML
95 PLASTIC BAG, INJECTION (ML) INTRAVENOUS ONCE
Status: COMPLETED | OUTPATIENT
Start: 2020-01-20 | End: 2020-01-20

## 2020-01-20 RX ORDER — HYDROCORTISONE 25 MG/G
CREAM TOPICAL 3 TIMES DAILY PRN
Status: DISCONTINUED | OUTPATIENT
Start: 2020-01-20 | End: 2020-01-21 | Stop reason: HOSPADM

## 2020-01-20 RX ORDER — IBUPROFEN 600 MG/1
600 TABLET ORAL EVERY 6 HOURS PRN
Status: DISCONTINUED | OUTPATIENT
Start: 2020-01-20 | End: 2020-01-21 | Stop reason: HOSPADM

## 2020-01-20 RX ORDER — SIMETHICONE 80 MG
1 TABLET,CHEWABLE ORAL EVERY 6 HOURS PRN
Status: DISCONTINUED | OUTPATIENT
Start: 2020-01-20 | End: 2020-01-21 | Stop reason: HOSPADM

## 2020-01-20 RX ORDER — OXYTOCIN/RINGER'S LACTATE 30/500 ML
2 PLASTIC BAG, INJECTION (ML) INTRAVENOUS CONTINUOUS
Status: DISCONTINUED | OUTPATIENT
Start: 2020-01-20 | End: 2020-01-20

## 2020-01-20 RX ORDER — LIDOCAINE HYDROCHLORIDE 10 MG/ML
INJECTION INFILTRATION; PERINEURAL
Status: COMPLETED
Start: 2020-01-20 | End: 2020-01-20

## 2020-01-20 RX ORDER — ONDANSETRON 8 MG/1
8 TABLET, ORALLY DISINTEGRATING ORAL EVERY 8 HOURS PRN
Status: DISCONTINUED | OUTPATIENT
Start: 2020-01-20 | End: 2020-01-21 | Stop reason: HOSPADM

## 2020-01-20 RX ORDER — OXYTOCIN/RINGER'S LACTATE 30/500 ML
334 PLASTIC BAG, INJECTION (ML) INTRAVENOUS CONTINUOUS
Status: DISCONTINUED | OUTPATIENT
Start: 2020-01-20 | End: 2020-01-21 | Stop reason: HOSPADM

## 2020-01-20 RX ORDER — OXYCODONE AND ACETAMINOPHEN 5; 325 MG/1; MG/1
1 TABLET ORAL EVERY 4 HOURS PRN
Status: DISCONTINUED | OUTPATIENT
Start: 2020-01-20 | End: 2020-01-21 | Stop reason: HOSPADM

## 2020-01-20 RX ORDER — SODIUM CHLORIDE 9 MG/ML
INJECTION, SOLUTION INTRAVENOUS CONTINUOUS
Status: DISCONTINUED | OUTPATIENT
Start: 2020-01-20 | End: 2020-01-21 | Stop reason: HOSPADM

## 2020-01-20 RX ORDER — OXYCODONE AND ACETAMINOPHEN 10; 325 MG/1; MG/1
1 TABLET ORAL EVERY 4 HOURS PRN
Status: DISCONTINUED | OUTPATIENT
Start: 2020-01-20 | End: 2020-01-21 | Stop reason: HOSPADM

## 2020-01-20 RX ORDER — DIPHENHYDRAMINE HYDROCHLORIDE 50 MG/ML
25 INJECTION INTRAMUSCULAR; INTRAVENOUS EVERY 4 HOURS PRN
Status: DISCONTINUED | OUTPATIENT
Start: 2020-01-20 | End: 2020-01-21 | Stop reason: HOSPADM

## 2020-01-20 RX ORDER — LIDOCAINE HYDROCHLORIDE AND EPINEPHRINE 10; 10 MG/ML; UG/ML
20 INJECTION, SOLUTION INFILTRATION; PERINEURAL ONCE
Status: COMPLETED | OUTPATIENT
Start: 2020-01-20 | End: 2020-01-20

## 2020-01-20 RX ORDER — SODIUM CHLORIDE, SODIUM LACTATE, POTASSIUM CHLORIDE, CALCIUM CHLORIDE 600; 310; 30; 20 MG/100ML; MG/100ML; MG/100ML; MG/100ML
INJECTION, SOLUTION INTRAVENOUS CONTINUOUS
Status: DISCONTINUED | OUTPATIENT
Start: 2020-01-20 | End: 2020-01-20

## 2020-01-20 RX ORDER — DOCUSATE SODIUM 100 MG/1
200 CAPSULE, LIQUID FILLED ORAL 2 TIMES DAILY PRN
Status: DISCONTINUED | OUTPATIENT
Start: 2020-01-20 | End: 2020-01-21 | Stop reason: HOSPADM

## 2020-01-20 RX ADMIN — Medication 95 MILLI-UNITS/MIN: at 06:01

## 2020-01-20 RX ADMIN — SODIUM CHLORIDE, SODIUM LACTATE, POTASSIUM CHLORIDE, AND CALCIUM CHLORIDE: .6; .31; .03; .02 INJECTION, SOLUTION INTRAVENOUS at 05:01

## 2020-01-20 RX ADMIN — DOCUSATE SODIUM 200 MG: 100 CAPSULE, LIQUID FILLED ORAL at 07:01

## 2020-01-20 RX ADMIN — LIDOCAINE HYDROCHLORIDE 200 MG: 10 INJECTION, SOLUTION INFILTRATION; PERINEURAL at 06:01

## 2020-01-20 RX ADMIN — Medication 334 MILLI-UNITS/MIN: at 06:01

## 2020-01-20 RX ADMIN — IBUPROFEN 600 MG: 600 TABLET, FILM COATED ORAL at 07:01

## 2020-01-20 RX ADMIN — OXYCODONE AND ACETAMINOPHEN 1 TABLET: 5; 325 TABLET ORAL at 07:01

## 2020-01-20 RX ADMIN — IBUPROFEN 600 MG: 600 TABLET, FILM COATED ORAL at 02:01

## 2020-01-20 NOTE — L&D DELIVERY NOTE
[facility]  Vaginal Delivery   Operative Note    SUMMARY     Normal spontaneous vaginal delivery of live female infant, was placed on mothers abdomen for skin to skin and bulb suctioning performed.  Infant delivered position OP over intact perineum and body follow easily.   Nuchal cord: No.    Spontaneous delivery of placenta and IV pitocin given noting good uterine tone.  1st degree laceration noted and right periurethral, area injected with lidocaine and both repaired with 3-0 chromic in figure of eight.  Patient tolerated delivery well. Sponge needle and lap counted correctly x2. EBL: 300 cc    Indications: <principal problem not specified>  Pregnancy complicated by:   Patient Active Problem List   Diagnosis    Gastroesophageal reflux disease without esophagitis    Nausea and vomiting    Late prenatal care    Non-reactive NST (non-stress test)    Pregnancy     Admitting GA: 39w0d    Delivery Information for Girl Micaela Johnson    Birth information:  YOB: 2019   Time of birth: 6:13 AM   Sex: female   Head Delivery Date/Time: 1/20/2020  6:12 AM   Delivery type: Vaginal, Spontaneous   Gestational Age: 39w0d    Delivery Providers    Delivering clinician:  Diana Byrne MD   Provider Role    Ivelisse Shannon, RN Registered Nurse    Anne Pérez RN Registered Nurse     Registered Nurse            Measurements    Weight:    Length:           Apgars    Living status:    Apgars:   1 min.:   5 min.:   10 min.:   15 min.:   20 min.:     Skin color:          Heart rate:          Reflex irritability:          Muscle tone:          Respiratory effort:          Total:                 Operative Delivery    Forceps attempted?:  No  Vacuum extractor attempted?:  No         Shoulder Dystocia    Shoulder dystocia present?:  No           Presentation    Position:  Occiput Posterior           Interventions/Resuscitation         Cord    Vessels:  3 vessels  Complications:  None  Delayed Cord  Clamping?:  Yes  Cord Blood Disposition:  Lab  Gases Sent?:  No  Stem Cell Collection (by MD):  No       Placenta    Placenta delivery date/time:    Placenta removal:             Labor Events:       labor: No     Labor Onset Date/Time:         Dilation Complete Date/Time:         Start Pushing Date/Time:         Start Pushing Date/Time:       Rupture Date/Time:              Rupture type:           Fluid Amount:        Fluid Color:        Fluid Odor:        Membrane Status:                 steroids: None     Antibiotics given for GBS: No     Induction: none     Indications for induction:        Augmentation:       Indications for augmentation:       Labor complications: None     Additional complications:          Cervical ripening:                     Delivery:      Episiotomy: None     Indication for Episiotomy:       Perineal Lacerations: 1st Repaired:  Yes   Periurethral Laceration: right Repaired: Yes   Labial Laceration:   Repaired:     Sulcus Laceration:   Repaired:     Vaginal Laceration:   Repaired:     Cervical Laceration:   Repaired:     Repair suture:       Repair # of packets: 1     Last Value - EBL - Nursing (mL):       Sum - EBL - Nursing (mL): 0     Last Value - EBL - Anesthesia (mL):      Calculated QBL (mL):        Vaginal Sweep Performed:       Surgicount Correct:         Other providers:            Details (if applicable):  Trial of Labor      Categorization:      Priority:     Indications for :     Incision Type:       Additional  information:  Forceps:    Vacuum:    Breech:    Observed anomalies    Other (Comments):

## 2020-01-20 NOTE — NURSING
"Late entry: 705: 35 yo  delivered baby girl via  at 0613. Currently breastfeeding, but states "I'm too tired. I would like to give her one bottle". Pt reporting 10/10 intermittent abdominal pain. Fundus firm, 1 cm below, moderate bleeding noted. Pitocin infusion continued. VSS. Call light within reach. Will continue to monitor.  "

## 2020-01-20 NOTE — CONSULTS
"Inpatient consult to Lactation  Consult performed by: Arlin Dunaway RN  Consult ordered by: Diana Byrne MD  Reason for consult: Breastfeeding assistance/education  Assessment/Recommendations: Lactation consult initiated. Pt requests using family member at bed side for translation. Pt states she  her first baby for a year and "knows everything" about breastfeeding. Denies pain or issues with latch. States she is doing both breast and formula feeding. Encouraged to start with breast before offering formula. Discussed importance of breastfeeding 8+times/24 hrs, supply/demand, nipple confusion/flow dependence. Discussed benefits of breastfeeding,risks of ff, adequacy of colostrum,signs of adequate feeding, and importance of good,deep latch to prevent nipple soreness. Mother will breastfeed on cue at least 8 or more times in 24 hours. Mother will monitor for adequate supply and monitor wet and dirty diapers. Mother will call for any breastfeeding needs.        "

## 2020-01-20 NOTE — LACTATION NOTE
[facility]  Lactation Note - Mom    SUMMARY     Maternal Assessment    Nipples: Bilateral:, graspable(per mom)  Left Nipple Symptoms: other (see comments)(denies pain)  Right Nipple Symptoms: other (see comments)(denies pain)      LATCH Score         Breasts WDL    Breast WDL: WDL, breast symptoms, nipple symptoms  Left Breast Symptoms: soft, nontender  Right Breast Symptoms: soft, nontender  Left Nipple Symptoms: other (see comments)(denies pain)  Right Nipple Symptoms: other (see comments)(denies pain)    Maternal Infant Feeding    Maternal Emotional State: independent, relaxed  Latch Assistance: other (see comments)(offered, pt declined)    Lactation Referrals         Lactation Interventions    Breast Care: Breastfeeding: supportive bra utilized  Breastfeeding Assistance: support offered  Breast Care: Breastfeeding: supportive bra utilized  Breastfeeding Assistance: support offered  Breastfeeding Support: diary/feeding log utilized, encouragement provided       Breastfeeding Session         Maternal Information    Date of Referral: 20  Person Making Referral: nurse  Maternal Reason for Referral: breastfeeding currently  Infant Reason for Referral:  infant

## 2020-01-20 NOTE — PLAN OF CARE
POC reviewed with pt and SO. Verbalized understanding. Questions encouraged and answered.  Fundal massage performed and bleeding assessed every 15 minutes x 1 hour, then every 30 minutes x 2 hours.   Pain assessed hourly. Pain medications given.  Pt plans to breastfeed. Breastfeeding guide to be provided and reviewed.  Questions encouraged and answered.

## 2020-01-20 NOTE — NURSING
Dr. Byrne made aware of cervical change and progression and made aware of patients family member refusing . Dr. Byrne stated she would be by in about an hour and it is okay to bring the martti in the room to confirm with patient the refusal of an .

## 2020-01-20 NOTE — NURSING
Wale video  used in Estonian. Patient requested sister hazel Martinez interpret for her and patient refused both the Wale  and an epidural.

## 2020-01-20 NOTE — H&P
CC: contractions    HPI:   Micaela Johnson is a 34 y.o. female  at 39 0/7 EGA who presents here for contractions and SROM, clear fluid.     ROS:  GENERAL: Denies weight gain or weight loss. Feeling well overall.   SKIN: Denies rash or lesions.   HEAD: Denies head injury or headache.   CHEST: Denies chest pain or shortness of breath.   CARDIOVASCULAR: Denies palpitations or left sided chest pain.   ABDOMEN: No abdominal pain, constipation, diarrhea, nausea, vomiting or rectal bleeding.   URINARY: No frequency, dysuria, hematuria, or burning on urination.  REPRODUCTIVE: See HPI.     No past medical history on file.  No past surgical history on file.  No family history on file.  Allergies: Patient has no known allergies.  Social History     Socioeconomic History    Marital status:      Spouse name: Not on file    Number of children: Not on file    Years of education: Not on file    Highest education level: Not on file   Occupational History    Not on file   Social Needs    Financial resource strain: Not on file    Food insecurity:     Worry: Not on file     Inability: Not on file    Transportation needs:     Medical: Not on file     Non-medical: Not on file   Tobacco Use    Smoking status: Never Smoker    Smokeless tobacco: Never Used   Substance and Sexual Activity    Alcohol use: No    Drug use: No    Sexual activity: Yes     Partners: Male     Birth control/protection: None   Lifestyle    Physical activity:     Days per week: Not on file     Minutes per session: Not on file    Stress: Not on file   Relationships    Social connections:     Talks on phone: Not on file     Gets together: Not on file     Attends Congregational service: Not on file     Active member of club or organization: Not on file     Attends meetings of clubs or organizations: Not on file     Relationship status: Not on file   Other Topics Concern    Not on file   Social History Narrative    Not on file     No meds        PE:   Temp:  [97.6 °F (36.4 °C)]   Pulse:  []   BP: (119)/(72)   SpO2:  [96 %-100 %]     APPEARANCE: Well nourished, well developed, in no acute distress.  CHEST: Lungs clear to auscultation.  HEART: Regular rate and rhythm, no murmurs, rubs or gallops.  ABDOMEN:  Gravid.   SVE:5/80/-1 per RN  /mod deshawn/+accel -decel   Rea: Q2-3 minutes    Assessment:  IUP 39 0/7 weeks  1. Labor: patient quickly progressed to completely dilated and delivered, please see delivery note  2. H/o should dystocia with previous pregnancy, resolved with minimal maneuvers after last delivery and infant with no defects. Discussed with her using her preferred  the risks of repeat should dystocia vs primary  and she prefers to continue with vaginal delivery.   3. Late/limited pre- care.

## 2020-01-20 NOTE — NURSING
Pt. Reports 10/10 headache pain because she is not sleeping. Ibuprofen 600mg given per pt. Request. Pt. Encouraged to relax and try to rest. Will monitor.

## 2020-01-20 NOTE — NURSING
POC reviewed with patient and sister in law who is  per pt. request. Oriented to room and call light. Menu provided. Pt. encouraged to call for assist with bathroom use. Verbalized understanding. Pain score 7/10 from cramping. Says it is getting better. Instructed on when pain meds are due. Pt. Verbalized understanding. LR+Pitocin infusing as ordered at 95ml/hr. Will monitor. Lights dimmed so pt. can rest.

## 2020-01-20 NOTE — NURSING
"Patient in triage stating "I felt my water break at 0300 and my contractions are about 3-4 minutes apart". Vital signs stable and afebrile. Patient denies bleeding, vision changes, nausea, vomiting, and diarrhea. Category 1 tracing. Cervical exam 4.5/50/-2. Contractions palpate mild to moderate. Patient scheduled for IOL on Wednesday, no induction orders in place. Dr. Mcmahan made aware of the above. Order to recheck patient and if patient is changing to call Dr. Byrne to make aware so she does not miss the delivery.  "

## 2020-01-20 NOTE — NURSING
Patient's sister in law Juan stated patient is refusing  martti system and refusing language line.

## 2020-01-20 NOTE — NURSING
Dr. Mcmahan made aware of patients cervical change. Dr. Mcmahan stated she would be rounding on her patients soon and close by.

## 2020-01-20 NOTE — NURSING
Dr. Byrne made aware of patients status. Dr. Byrne stated she would be in to see the patient in about an hour.

## 2020-01-20 NOTE — PLAN OF CARE
VSS. Pt voiding without difficulty.  Ambulating well.  Encouraged ambulation as tolerated, to assist in expelling flatus.  Verbalized understanding.  Tolerating regular diet.  Reports pain relieved with pain meds.  Questions encouraged and answered.  Bonding appropriately with infant.Encouraged to feed infant 8 or more times in 24 hour period and on demand feedings.  Verbalized understanding.

## 2020-01-20 NOTE — NURSING
1600pm=  Awake, sitting up in bed, resp even and unlabored.  Speaks [place].  Family at bedside to translate.  POC reviewed with pt and verbalized understanding.  Questions answered.  No change since previous assessment.  Safety maintained with call light in reach.

## 2020-01-20 NOTE — NURSING
Pt. Assisted up to BR to void. Gait steady. Voided 100ml without difficulty. Pericare done. IV saline locked. Pt. Reports mild headache. Encouraged to eat. Will monitor.

## 2020-01-21 VITALS
HEART RATE: 79 BPM | TEMPERATURE: 98 F | RESPIRATION RATE: 20 BRPM | DIASTOLIC BLOOD PRESSURE: 76 MMHG | HEIGHT: 62 IN | BODY MASS INDEX: 37.32 KG/M2 | SYSTOLIC BLOOD PRESSURE: 137 MMHG | WEIGHT: 202.81 LBS | OXYGEN SATURATION: 99 %

## 2020-01-21 PROBLEM — Z34.90 PREGNANCY: Status: RESOLVED | Noted: 2020-01-20 | Resolved: 2020-01-21

## 2020-01-21 PROBLEM — O09.30 LATE PRENATAL CARE: Status: RESOLVED | Noted: 2018-08-02 | Resolved: 2020-01-21

## 2020-01-21 LAB
BASOPHILS # BLD AUTO: 0.04 K/UL (ref 0–0.2)
BASOPHILS NFR BLD: 0.5 % (ref 0–1.9)
DIFFERENTIAL METHOD: ABNORMAL
EOSINOPHIL # BLD AUTO: 0.1 K/UL (ref 0–0.5)
EOSINOPHIL NFR BLD: 1.3 % (ref 0–8)
ERYTHROCYTE [DISTWIDTH] IN BLOOD BY AUTOMATED COUNT: 12.3 % (ref 11.5–14.5)
HCT VFR BLD AUTO: 30.9 % (ref 37–48.5)
HGB BLD-MCNC: 9.9 G/DL (ref 12–16)
LYMPHOCYTES # BLD AUTO: 2 K/UL (ref 1–4.8)
LYMPHOCYTES NFR BLD: 22.7 % (ref 18–48)
MCH RBC QN AUTO: 28.8 PG (ref 27–31)
MCHC RBC AUTO-ENTMCNC: 32 G/DL (ref 32–36)
MCV RBC AUTO: 90 FL (ref 82–98)
MONOCYTES # BLD AUTO: 0.9 K/UL (ref 0.3–1)
MONOCYTES NFR BLD: 10.2 % (ref 4–15)
NEUTROPHILS # BLD AUTO: 5.6 K/UL (ref 1.8–7.7)
NEUTROPHILS NFR BLD: 65.3 % (ref 38–73)
PLATELET # BLD AUTO: 254 K/UL (ref 150–350)
PMV BLD AUTO: 9.7 FL (ref 9.2–12.9)
RBC # BLD AUTO: 3.44 M/UL (ref 4–5.4)
WBC # BLD AUTO: 8.64 K/UL (ref 3.9–12.7)

## 2020-01-21 PROCEDURE — 99238 PR HOSPITAL DISCHARGE DAY,<30 MIN: ICD-10-PCS | Mod: ,,, | Performed by: OBSTETRICS & GYNECOLOGY

## 2020-01-21 PROCEDURE — 25000003 PHARM REV CODE 250: Performed by: OBSTETRICS & GYNECOLOGY

## 2020-01-21 PROCEDURE — 99238 HOSP IP/OBS DSCHRG MGMT 30/<: CPT | Mod: ,,, | Performed by: OBSTETRICS & GYNECOLOGY

## 2020-01-21 PROCEDURE — 85025 COMPLETE CBC W/AUTO DIFF WBC: CPT

## 2020-01-21 PROCEDURE — 36415 COLL VENOUS BLD VENIPUNCTURE: CPT

## 2020-01-21 RX ORDER — IBUPROFEN 600 MG/1
600 TABLET ORAL EVERY 6 HOURS PRN
Qty: 30 TABLET | Refills: 0 | Status: SHIPPED | OUTPATIENT
Start: 2020-01-21

## 2020-01-21 RX ORDER — OXYCODONE AND ACETAMINOPHEN 5; 325 MG/1; MG/1
1 TABLET ORAL EVERY 4 HOURS PRN
Qty: 10 TABLET | Refills: 0 | Status: SHIPPED | OUTPATIENT
Start: 2020-01-21

## 2020-01-21 RX ADMIN — IBUPROFEN 600 MG: 600 TABLET, FILM COATED ORAL at 02:01

## 2020-01-21 RX ADMIN — OXYCODONE HYDROCHLORIDE AND ACETAMINOPHEN 1 TABLET: 10; 325 TABLET ORAL at 08:01

## 2020-01-21 RX ADMIN — IBUPROFEN 600 MG: 600 TABLET, FILM COATED ORAL at 08:01

## 2020-01-21 RX ADMIN — OXYCODONE AND ACETAMINOPHEN 1 TABLET: 5; 325 TABLET ORAL at 02:01

## 2020-01-21 RX ADMIN — DOCUSATE SODIUM 200 MG: 100 CAPSULE, LIQUID FILLED ORAL at 08:01

## 2020-01-21 RX ADMIN — OXYCODONE HYDROCHLORIDE AND ACETAMINOPHEN 1 TABLET: 10; 325 TABLET ORAL at 01:01

## 2020-01-21 NOTE — NURSING
0820am=  Awake, in bed, resp even and unlabored.  Pt speaks Persian.  Family at bedside to translate.  POC reviewed with pt, and pt verbalized understanding.  Pt states would like to be discharged home today, due to pt has toddler at home, and needs to care for toddler also. Will inform MD and infant's NP.  Pt reports generalized soreness.  States had no epidural and is feeling the effects of no epidural. Voices no further complaints at present.

## 2020-01-21 NOTE — LACTATION NOTE
[facility]  Lactation Note - Mom    SUMMARY     Maternal Assessment    Breast Density: Bilateral:, soft  Nipples: Bilateral:, graspable(per mom)  Left Nipple Symptoms: other (see comments)(denies pain)  Right Nipple Symptoms: other (see comments)(denies pain)      LATCH Score         Breasts WDL    Breast WDL: WDL(per mom)  Left Breast Symptoms: soft, nontender  Right Breast Symptoms: soft, nontender  Left Nipple Symptoms: other (see comments)(denies pain)  Right Nipple Symptoms: other (see comments)(denies pain)    Maternal Infant Feeding    Maternal Emotional State: relaxed  Pain with Feeding: no(per mom)  Latch Assistance: other (see comments)(offered, pt declined)    Lactation Referrals         Lactation Interventions    Breast Care: Breastfeeding: supportive bra utilized  Breastfeeding Assistance: feeding cue recognition promoted, feeding on demand promoted, support offered  Breast Care: Breastfeeding: supportive bra utilized  Breastfeeding Assistance: feeding cue recognition promoted, feeding on demand promoted, support offered  Breastfeeding Support: encouragement provided, lactation counseling provided       Breastfeeding Session         Maternal Information    Date of Referral: 20  Person Making Referral: nurse  Maternal Reason for Referral: breastfeeding currently  Infant Reason for Referral:  infant

## 2020-01-21 NOTE — PROGRESS NOTES
"POD #1 s/p     Subjective: No complaints. Doing well. Denies any bleeding or discharge. Patient actually desires to leave hospital today and does not wish to stay another night. Baby is currently being monitored by nursery and they are trending bilirubin levels.     Objective:   /88 (BP Location: Left arm, Patient Position: Lying)   Pulse 72   Temp 97.5 °F (36.4 °C) (Oral)   Resp 18   Ht 5' 2" (1.575 m)   Wt 92 kg (202 lb 13.2 oz)   SpO2 100%   Breastfeeding? Yes   BMI 37.10 kg/m²     I/O last 3 completed shifts:  In: 1257.7 [P.O.:400; I.V.:857.7]  Out: 430 [Urine:400; Blood:30]  No intake/output data recorded.    H/H:   Lab Results   Component Value Date    WBC 8.64 2020    HGB 9.9 (L) 2020    HCT 30.9 (L) 2020    MCV 90 2020     2020     Chest: Clear to auscultation  CV: Regular rate and rhythm  Abdomen: Non-tender, non-distended, soft, positive bowel sounds  Extremities: Non-tender, no edema    Assessment:POD #1     Plan: Routine progressive care. Considering possible discharge later in the day.   "

## 2020-01-21 NOTE — PLAN OF CARE
Mom will continue to breastfeed frequently & on cue at least 8+ times/24 hrs.  Will monitor for signs of adequate fdg.  Will avoid giving formula if BR well. Discussed supply/demand; possible risks of FF.  Will call for any needs.

## 2020-01-21 NOTE — LACTATION NOTE
This note was copied from a baby's chart.  Baby in crib at mom's bs lying on back swaddled in receiving blanket with large fleece blanket over baby. Discussed safe sleep. Mom removed fleece blanket from baby. Verbalized understanding.

## 2020-01-21 NOTE — DISCHARGE INSTRUCTIONS
"Patient Discharge Instructions for Postpartum Women    Resume Regular Diet  Increase activity gradually, no heavy lifting  Shower only; Do NOT submerge in a tub of water, to bath, until ok with doctor.    No tampons, douching or sexual intercourse for 6 weeks.   Discuss birth control options with your physician.  Wear a support bra  Return to work/school when you've been cleared by a physician    Call your physician if     *Fever of 100.4 or higher  *Persistent nausea/ vomiting  *Incisional drainage  *Heavy vaginal bleeding or large clots (Heavy bleeding is soaking 1 pad in an hour)  *Swelling and pain in arms or legs  *Severe headaches, blurred vision or fainting  *Shortness of breath  *Frequency and burning with urination  *Signs of postpartum depression, discuss these signs with your physician    Call lactation services for questions regarding feeding, nipple and breast care, and general questions about lactation.  They can be reached at [phone]         Understanding Postpartum Depression    You've just had a baby.  You know you should be excited and happy.  But instead you find yourself crying for no reason.  You may have trouble coping with your daily tasks.  You feel sad, tired, and hopeless most of the time.  You may even feel ashamed or guilty.  But what you're going through is not your fault and you can feel better.  Talk to your doctor.  He or she can help.    Depression After Childbirth    You may be weepy and tired right after giving birth.  These feelings are normal.  They're sometimes called the "baby blues."  These blues go away 2-3 weeks.  However, postpartum (meaning "after birth") depression lasts much longer and is more sever than the "baby blues."  It can make you feel sad and hopeless.  You may also fear that your baby will be harmed and worry about being a bad mother.      What is Depression?    Depression is a mood disorder that affects the way you think and feel.  The most common symptom " is a feeling of deep sadness.  You may also feel as if you just can't cope with life.    Other symptoms include:      * Gaining or loosing weight  * Sleeping too much or too little  * Feeling tired all the time  * Feeling restless  * Fears of harming your baby   * Lack of interest in your baby  * Feeling worthless or guilty  * No longer finding pleasure in things you used to  * Having trouble thinking clearly or making decisions  * Thoughts of hurting yourself or your baby    What Causes Postpartum Depression    The exact causes of postpartum depression isn't known.  It may be due to changes in your hormones during and after childbirth.  You may also be tired from caring for your baby and adjusting to being a mother.  All these factors may make you feel depressed.  In some cases, your genes may also play a role.    Depression Can Be Treated    The good news is that there are many ways to treat postpartum depression.  Talking to your doctor is the first step toward feeling better.    Resources:    * National [place] of Mental Health  -- 649.835.7054    www.nimh.nih.gov    * National [place] on Mental Illness --889.371.6879    Www.magdy.org    * Mental Health [place] -- 667.971.7793     Www.nmha.org    * National Suicide Hotline --515.960.6097 (800-SUICIDE)    4027-0559 [employer]  All rights reserved.  This information is not intended as a substitute for professional medical care.  Always follow up with your healthcare professional's instructions.        Breastfeeding Discharge Instructions       Feed the baby at the earliest sign of hunger or comfort  o Hands to mouth, sucking motions  o Rooting or searching for something to suck on  o Dont wait for crying - it is a sign of distress     The feedings may be 8-12 times per 24hrs and will not follow a schedule   Avoid pacifiers and bottles for the first 4 weeks   Alternate the breast you start the feeding with, or start with the breast that feels  the fullest   Switch breasts when the baby takes himself off the breast or falls asleep   Keep offering breasts until the baby looks full, no longer gives hunger signs, and stays asleep when placed on his back in the crib   If the baby is sleepy and wont wake for a feeding, put the baby skin-to-skin dressed in a diaper against the mothers bare chest   Sleep near your baby   The baby should be positioned and latched on to the breast correctly  o Chest-to-chest, chin in the breast  o Babys lips are flipped outward  o Babys mouth is stretched open wide like a shout  o Babys sucking should feel like tugging to the mother  - The baby should be drinking at the breast:  o You should hear swallowing or gulping throughout the feeding  o You should see milk on the babys lips when he comes off the breast  o Your breasts should be softer when the baby is finished feeding  o The baby should look relaxed at the end of feedings  o After the 4th day and your milk is in:  o The babys poop should turn bright yellow and be loose, watery, and seedy  o The baby should have at least 3-4 poops the size of the palm of your hand per day  o The baby should have at least 5-6 wet diapers per day  o The urine should be light yellow in color  You should drink when you are thirsty and eat a healthy diet when you are    hungry.     Take naps to get the rest you need.   Take medications and/or drink alcohol only with permission of your obstetrician    or the babys pediatrician.  You can also call the Infant Risk Center,   ([phone]), Monday-Friday, 8am-5pm Central time, to get the most   up-to-date evidence-based information on the use of medications during   pregnancy and breastfeeding.      The baby should be examined by a pediatrician at 3-5 days of age.  Once your   milk comes in, the baby should be gaining at least ½ - 1oz each day and should be back to birthweight no later than 10-14 days of age.          Community  Resources    [facility] Breastfeeding Warmline: 115.748.9310  Local [facility] clinics: provide incentives and breastpumps to eligible mothers  La Leche League International (LLLI):  mother-to-mother support group website        www.lll.org  Local La Leche [place] mother-to-mother support groups:        www.llgalileoRingostat.RatePoint        La Leche League [facility]   Dr. Dm Pressley website for latch videos and general information:        www.breastfeedinginc.ca  Infant Risk Center is a call center that provides information about the safety of taking medications while breastfeeding.  Call 1-576.279.7431, M-F, 8am-5pm, [place].  International Lactation Consultant Association provides resources for assistance:        www.ilca.org  [facility] Breastfeeding Coalition provides informationand resources for parents  and the community    http://[facility].org     [employer] is a mom-to-mom support group:                             www.nolanesting.com//breastfeedng-support/  [employer] for Healthy Babies:  7-762-227-BABY(5586)  [place] [employer] [place]: a breastfeeding support group for women of color, 714.389.6306

## 2020-01-22 ENCOUNTER — TELEPHONE (OUTPATIENT)
Dept: OBSTETRICS AND GYNECOLOGY | Facility: CLINIC | Age: 35
End: 2020-01-22

## 2020-01-22 NOTE — DISCHARGE SUMMARY
[facility]  Discharge Summary      Admit Date: 2020    Discharge Date and Time:  2020 6:59 PM    Attending Physician: Diana Byrne MD     Reason for Admission: spontaneous vaginal delivery    Procedures Performed: * No surgery found *    Hospital Course Pt is a 34 y.o. yo  PPD 2 from vaginal delivery. She was admitted on 2020 for vaginal delivery. She was managed accordingly. She delivered a female infant.  By PPD 2, she was voiding without difficulty, ambulating well, tolerating a diet, and passing flatus. Her pain is well controlled. H/H is stable. She was desiring discharge home. She was given discharge teaching and instructions prior to leaving the hospital.  Pt voiced understanding of all instructions.     Consults: none    Significant Diagnostic Studies:   Recent Labs   Lab 20  0509 20  0556   WBC 8.74 8.64   HGB 11.0* 9.9*   HCT 33.7* 30.9*    254     Final Diagnoses:    Principal Problem:  (spontaneous vaginal delivery)   Secondary Diagnoses:   Active Hospital Problems    Diagnosis  POA    * (spontaneous vaginal delivery) [O80]  Not Applicable      Resolved Hospital Problems    Diagnosis Date Resolved POA    Pregnancy [Z34.90] 2020 Not Applicable    Pregnancy [Z34.90] 2020 Not Applicable    Late prenatal care [O09.30] 2020 Not Applicable       Discharged Condition: stable    Disposition: Home or Self Care    Follow Up/Patient Instructions:     Medications:  Reconciled Home Medications:      Medication List      START taking these medications    ibuprofen 600 MG tablet  Commonly known as:  ADVIL,MOTRIN  Take 1 tablet (600 mg total) by mouth every 6 (six) hours as needed (cramping).     oxyCODONE-acetaminophen 5-325 mg per tablet  Commonly known as:  PERCOCET  Take 1 tablet by mouth every 4 (four) hours as needed.        CONTINUE taking these medications    Calcium 500 + D 500 mg(1,250mg) -200 unit per tablet  Generic  drug:  calcium-vitamin D3  Take 1 tablet by mouth once daily.     famotidine 20 MG tablet  Commonly known as:  PEPCID  Take 1 tablet (20 mg total) by mouth 2 (two) times daily.     * ferrous sulfate 324 mg (65 mg iron) Tbec  Take 325 mg by mouth once daily.     * ferrous sulfate 325 (65 FE) MG EC tablet  Take 1 tablet (325 mg total) by mouth 2 (two) times daily.     prenatal vit,tre 74-iron-folic 27 mg iron- 1 mg Tab  Take 1 tablet by mouth once daily.         * This list has 2 medication(s) that are the same as other medications prescribed for you. Read the directions carefully, and ask your doctor or other care provider to review them with you.            STOP taking these medications    nystatin cream  Commonly known as:  MYCOSTATIN     terconazole 0.4 % Crea  Commonly known as:  TERAZOL 7     triamcinolone acetonide 0.1% 0.1 % cream  Commonly known as:  KENALOG          No discharge procedures on file.  Follow-up Information     Diana Byrne MD In 6 weeks.    Specialty:  Obstetrics and Gynecology  Why:  Call doctor with any problems or concerns,or go to the nearest Emergency Room.   Contact information:  [address] [place] [address] [phone]

## 2020-01-22 NOTE — NURSING
1927pm=  Written discharge instructions given and explained to pt. Pt's family at bedside to translate to pt.   Also reviewed Mother-baby care guide with pt.   Questions encouraged and answered.  Pt verbalized understanding.  Pt reports received home Rx meds from [facility].  Explanation for use of meds given, and informed pt to follow pharmacy's instructions for how to take meds.  Pt verbalized understanding.   1934pm =Pt discharged off unit, via wheelchair, resp even and unlabored.  Accompanied by this RN and family.   No distress noted.  All belongings packed per pt and family, and sent with pt.

## 2020-01-27 ENCOUNTER — OFFICE VISIT (OUTPATIENT)
Dept: URGENT CARE | Facility: CLINIC | Age: 35
End: 2020-01-27
Payer: MEDICAID

## 2020-01-27 VITALS
BODY MASS INDEX: 37.32 KG/M2 | OXYGEN SATURATION: 98 % | RESPIRATION RATE: 18 BRPM | HEART RATE: 74 BPM | HEIGHT: 62 IN | TEMPERATURE: 98 F | WEIGHT: 202.81 LBS | SYSTOLIC BLOOD PRESSURE: 153 MMHG | DIASTOLIC BLOOD PRESSURE: 91 MMHG

## 2020-01-27 DIAGNOSIS — R03.0 BLOOD PRESSURE ELEVATED WITHOUT HISTORY OF HTN: Primary | ICD-10-CM

## 2020-01-27 DIAGNOSIS — R51.9 ACUTE NONINTRACTABLE HEADACHE, UNSPECIFIED HEADACHE TYPE: ICD-10-CM

## 2020-01-27 LAB
BILIRUB UR QL STRIP: NEGATIVE
GLUCOSE UR QL STRIP: NEGATIVE
KETONES UR QL STRIP: NEGATIVE
LEUKOCYTE ESTERASE UR QL STRIP: POSITIVE
PH, POC UA: 7.5 (ref 5–8)
POC BLOOD, URINE: POSITIVE
POC NITRATES, URINE: NEGATIVE
PROT UR QL STRIP: NEGATIVE
SP GR UR STRIP: 1.01 (ref 1–1.03)
UROBILINOGEN UR STRIP-ACNC: NORMAL (ref 0.1–1.1)

## 2020-01-27 PROCEDURE — 99214 OFFICE O/P EST MOD 30 MIN: CPT | Mod: 25,S$GLB,, | Performed by: FAMILY MEDICINE

## 2020-01-27 PROCEDURE — 81003 URINALYSIS AUTO W/O SCOPE: CPT | Mod: QW,S$GLB,, | Performed by: FAMILY MEDICINE

## 2020-01-27 PROCEDURE — 81003 POCT URINALYSIS, DIPSTICK, AUTOMATED, W/O SCOPE: ICD-10-PCS | Mod: QW,S$GLB,, | Performed by: FAMILY MEDICINE

## 2020-01-27 PROCEDURE — 99214 PR OFFICE/OUTPT VISIT, EST, LEVL IV, 30-39 MIN: ICD-10-PCS | Mod: 25,S$GLB,, | Performed by: FAMILY MEDICINE

## 2020-01-27 RX ORDER — HYDROCODONE BITARTRATE AND ACETAMINOPHEN 5; 325 MG/1; MG/1
1 TABLET ORAL EVERY 8 HOURS PRN
Qty: 20 TABLET | Refills: 0 | Status: SHIPPED | OUTPATIENT
Start: 2020-01-27 | End: 2020-01-30

## 2020-01-28 ENCOUNTER — TELEPHONE (OUTPATIENT)
Dept: OBSTETRICS AND GYNECOLOGY | Facility: CLINIC | Age: 35
End: 2020-01-28

## 2020-01-28 NOTE — PATIENT INSTRUCTIONS
Please return or see your primary care doctor if you develop new or worsening symptoms.     Please arrange follow up with your primary medical clinic as soon as possible. You must understand that you've received an Urgent Care treatment only and that you may be released before all of your medical problems are known or treated. You, the patient, will arrange for follow up as instructed. If your symptoms worsen or fail to improve you should go to the Emergency Room.  Follow up with your OB TOMORROW AM      Established High Blood Pressure    High blood pressure (hypertension) is a chronic disease. Often, healthcare providers dont know what causes it. But it can be caused by certain health conditions and medicines.  If you have high blood pressure, you may not have any symptoms. If you do have symptoms, they may include headache, dizziness, changes in your vision, chest pain, and shortness of breath. But even without symptoms, high blood pressure thats not treated raises your risk for heart attack and stroke. High blood pressure is a serious health risk and shouldnt be ignored.  A blood pressure reading is made up of two numbers: a higher number over a lower number. The top number is the systolic pressure. The bottom number is the diastolic pressure. A normal blood pressure is a systolic pressure of  less than 120 over a diastolic pressure of less than 80. You will see your blood pressure readings written together. For example, a person with a systolic pressure of 188 and a diastolic pressure of 78 will have 118/78 written in the medical record.  High blood pressure is when either the top number is 140 or higher, or the bottom number is 90 or higher. This must be the result when taking your blood pressure a number of times. The blood pressures between normal and high are called prehypertension.  Home care  If you have high blood pressure, you should do what is listed below to lower your blood pressure. If you are  taking medicines for high blood pressure, these methods may reduce or end your need for medicines in the future.  · Begin a weight-loss program if you are overweight.  · Cut back on how much salt you get in your diet. Heres how to do this:  ¨ Dont eat foods that have a lot of salt. These include olives, pickles, smoked meats, and salted potato chips.  ¨ Dont add salt to your food at the table.  ¨ Use only small amounts of salt when cooking.  · Start an exercise program. Talk with your healthcare provider about the type of exercise program that would be best for you. It doesn't have to be hard. Even brisk walking for 20 minutes 3 times a week is a good form of exercise.  · Dont take medicines that stimulate the heart. This includes many over-the-counter cold and sinus decongestant pills and sprays, as well as diet pills. Check the warnings about hypertension on the label. Before buying any over-the-counter medicines or supplements, always ask the pharmacist about the product's potential interaction with your high blood pressure and your high blood pressure medicines.  · Stimulants such as amphetamine or cocaine could be deadly for someone with high blood pressure. Never take these.  · Limit how much caffeine you get in your diet. Switch to caffeine-free products.  · Stop smoking. If you are a long-time smoker, this can be hard. Talk to your healthcare provider about medicines and nicotine replacement options to help you. Also, enroll in a stop-smoking program to make it more likely that you will quit for good.  · Learn how to handle stress. This is an important part of any program to lower blood pressure. Learn about relaxation methods like meditation, yoga, or biofeedback.  · If your provider prescribed medicines, take them exactly as directed. Missing doses may cause your blood pressure get out of control.  · If you miss a dose or doses, check with your healthcare provider or pharmacist about what to  do.  · Consider buying an automatic blood pressure machine. Ask your provider for a recommendation. You can get one of these at most pharmacies.     The American Heart Association recommends the following guidelines for home blood pressure monitoring:  · Don't smoke or drink coffee for 30 minutes before taking your blood pressure.  · Go to the bathroom before the test.  · Relax for 5 minutes before taking the measurement.  · Sit with your back supported (don't sit on a couch or soft chair); keep your feet on the floor uncrossed. Place your arm on a solid flat surface (like a table) with the upper part of the arm at heart level. Place the middle of the cuff directly above the eye of the elbow. Check the monitor's instruction manual for an illustration.  · Take multiple readings. When you measure, take 2 to 3 readings one minute apart and record all of the results.  · Take your blood pressure at the same time every day, or as your healthcare provider recommends.  · Record the date, time, and blood pressure reading.  · Take the record with you to your next medical appointment. If your blood pressure monitor has a built-in memory, simply take the monitor with you to your next appointment.  · Call your provider if you have several high readings. Don't be frightened by a single high blood pressure reading, but if you get several high readings, check in with your healthcare provider.  · Note: When blood pressure reaches a systolic (top number) of 180 or higher OR diastolic (bottom number) of 110 or higher, seek emergency medical treatment.  Follow-up care  You will need to see your healthcare provider regularly. This is to check your blood pressure and to make changes to your medicines. Make a follow-up appointment as directed. Bring the record of your home blood pressure readings to the appointment.  When to seek medical advice  Call your healthcare provider right away if any of these occur:  · Blood pressure reaches a  systolic (upper number) of 180 or higher OR a diastolic (bottom number) of 110 or higher  · Chest pain or shortness of breath  · Severe headache  · Throbbing or rushing sound in the ears  · Nosebleed  · Sudden severe pain in your belly (abdomen)  · Extreme drowsiness, confusion, or fainting  · Dizziness or spinning sensation (vertigo)  · Weakness of an arm or leg or one side of the face  · You have problems speaking or seeing   Date Last Reviewed: 12/1/2016  © 3098-8515 [employer]. [address] [place] [address]. All rights reserved. This information is not intended as a substitute for professional medical care. Always follow your healthcare professional's instructions.

## 2020-01-28 NOTE — TELEPHONE ENCOUNTER
Returned patient's call and spoke with patient.  She delivered on 1/20/2020.  Patient complaining of severe headache.  Instructed patient to go to ER to be checked out.  Patient verbalized understanding.  All questions addressed and answered.

## 2020-01-28 NOTE — TELEPHONE ENCOUNTER
----- Message from Deysi Burdick sent at 1/28/2020  8:33 AM CST -----  Contact: [phone]/self  Type:  Needs Medical Advice    Who Called: pt  Symptoms (please be specific): elevated bp 153/93  How long has patient had these symptoms:  Few days, pt went to ER yesterday  Pharmacy name and phone #: [facility] [address]  Would the patient rather a call back or a response via [facility]? call  Best Call Back Number: [phone]  Additional Information: delivered on 1/20/20

## 2020-01-28 NOTE — PROGRESS NOTES
"Subjective:       Patient ID: Micaela Johnson is a 34 y.o. female.    Vitals:  height is 5' 2" (1.575 m) and weight is 92 kg (202 lb 13.2 oz). Her temperature is 98 °F (36.7 °C). Her blood pressure is 153/91 (abnormal) and her pulse is 74. Her respiration is 18 and oxygen saturation is 98%.     Chief Complaint: Hypertension    S/P one week postpartum with  at term with hx of hypertension c/o headache with no neck pain , visual changes , not worst headache of her life. Denies swelling of feet and legs     Hypertension   This is a new problem. The current episode started yesterday. The problem is unchanged. Associated symptoms include headaches. Pertinent negatives include no blurred vision, chest pain or shortness of breath. There are no known risk factors for coronary artery disease. Past treatments include nothing. There are no compliance problems.        Constitution: Negative for chills, fatigue and fever.   HENT: Negative for congestion and sore throat.    Neck: Negative for painful lymph nodes.   Cardiovascular: Negative for chest pain and leg swelling.   Eyes: Negative for double vision and blurred vision.   Respiratory: Negative for cough and shortness of breath.    Gastrointestinal: Negative for nausea, vomiting and diarrhea.   Genitourinary: Negative for dysuria, frequency, urgency and history of kidney stones.   Musculoskeletal: Negative for joint pain, joint swelling, muscle cramps and muscle ache.   Skin: Negative for color change, pale, rash and bruising.   Allergic/Immunologic: Negative for seasonal allergies.   Neurological: Positive for headaches. Negative for dizziness, history of vertigo, light-headedness and passing out.   Hematologic/Lymphatic: Negative for swollen lymph nodes.   Psychiatric/Behavioral: Negative for nervous/anxious, sleep disturbance and depression. The patient is not nervous/anxious.        Objective:      Physical Exam   Constitutional: She is oriented to person, place, and " time. She appears well-developed and well-nourished. She is cooperative.  Non-toxic appearance. She does not appear ill. No distress.   HENT:   Head: Normocephalic and atraumatic.   Right Ear: Hearing, tympanic membrane, external ear and ear canal normal.   Left Ear: Hearing, tympanic membrane, external ear and ear canal normal.   Nose: Nose normal. No mucosal edema, rhinorrhea or nasal deformity. No epistaxis. Right sinus exhibits no maxillary sinus tenderness and no frontal sinus tenderness. Left sinus exhibits no maxillary sinus tenderness and no frontal sinus tenderness.   Mouth/Throat: Uvula is midline, oropharynx is clear and moist and mucous membranes are normal. No trismus in the jaw. Normal dentition. No uvula swelling. No posterior oropharyngeal erythema.   Eyes: Pupils are equal, round, and reactive to light. Conjunctivae, EOM and lids are normal. Right eye exhibits no discharge. Left eye exhibits no discharge. No scleral icterus.   Fundoscopic exam:       The right eye shows no papilledema.        The left eye shows no papilledema.   Neck: Trachea normal, normal range of motion, full passive range of motion without pain and phonation normal. Neck supple.   Cardiovascular: Normal rate, regular rhythm, normal heart sounds, intact distal pulses and normal pulses.   Pulmonary/Chest: Effort normal and breath sounds normal. No respiratory distress.   Abdominal: Soft. Normal appearance and bowel sounds are normal. She exhibits no distension, no pulsatile midline mass and no mass. There is no tenderness.   Musculoskeletal: Normal range of motion. She exhibits no edema or deformity.        Legs:  Neurological: She is alert and oriented to person, place, and time. She exhibits normal muscle tone. Coordination normal.   Skin: Skin is warm, dry, intact, not diaphoretic and not pale.   Psychiatric: She has a normal mood and affect. Her speech is normal and behavior is normal. Judgment and thought content normal.  Cognition and memory are normal.   Nursing note and vitals reviewed.        Assessment:       No diagnosis found.    Plan:         There are no diagnoses linked to this encounter.

## 2020-02-05 ENCOUNTER — TELEPHONE (OUTPATIENT)
Dept: OBSTETRICS AND GYNECOLOGY | Facility: CLINIC | Age: 35
End: 2020-02-05

## 2020-02-05 NOTE — TELEPHONE ENCOUNTER
Patient's Paragard was received and is in Iwona Spangler's office under buy and bill.  Patient scheduled for postpartum visit and IUD insertion on 2/26/2020.

## 2020-02-05 NOTE — TELEPHONE ENCOUNTER
Language line used for Kyr[place], free to patient,  ID# [id number]. States everything is going well with breastfeeding. Denies pain to breasts and nipples. Baby friendly audit done. Pt denies questions or needs. Encouraged to call lactation center as needed.

## 2023-12-07 NOTE — PATIENT INSTRUCTIONS
You have an induction of labor schedule for Wednesday 8/22/18 at 9 pm. Please go to L&D on the third floor at 9pm.   
Quality 110: Preventive Care And Screening: Influenza Immunization: Influenza Immunization previously received during influenza season
Detail Level: Detailed